# Patient Record
Sex: FEMALE | Race: WHITE | Employment: OTHER | URBAN - METROPOLITAN AREA
[De-identification: names, ages, dates, MRNs, and addresses within clinical notes are randomized per-mention and may not be internally consistent; named-entity substitution may affect disease eponyms.]

---

## 2018-04-16 ENCOUNTER — OFFICE VISIT (OUTPATIENT)
Dept: PHYSICAL MEDICINE AND REHAB | Age: 63
End: 2018-04-16
Payer: MEDICAID

## 2018-04-16 VITALS
TEMPERATURE: 97 F | WEIGHT: 152 LBS | HEIGHT: 61 IN | DIASTOLIC BLOOD PRESSURE: 76 MMHG | SYSTOLIC BLOOD PRESSURE: 140 MMHG | BODY MASS INDEX: 28.7 KG/M2 | HEART RATE: 61 BPM

## 2018-04-16 DIAGNOSIS — G89.29 BILATERAL CHRONIC KNEE PAIN: ICD-10-CM

## 2018-04-16 DIAGNOSIS — G89.29 CHRONIC BILATERAL LOW BACK PAIN WITH BILATERAL SCIATICA: Primary | ICD-10-CM

## 2018-04-16 DIAGNOSIS — M25.561 BILATERAL CHRONIC KNEE PAIN: ICD-10-CM

## 2018-04-16 DIAGNOSIS — M54.42 CHRONIC BILATERAL LOW BACK PAIN WITH BILATERAL SCIATICA: Primary | ICD-10-CM

## 2018-04-16 DIAGNOSIS — M17.0 PRIMARY OSTEOARTHRITIS OF BOTH KNEES: ICD-10-CM

## 2018-04-16 DIAGNOSIS — M54.41 CHRONIC BILATERAL LOW BACK PAIN WITH BILATERAL SCIATICA: Primary | ICD-10-CM

## 2018-04-16 DIAGNOSIS — M54.16 LUMBAR RADICULOPATHY: ICD-10-CM

## 2018-04-16 DIAGNOSIS — M25.562 BILATERAL CHRONIC KNEE PAIN: ICD-10-CM

## 2018-04-16 PROCEDURE — 2500000003 HC RX 250 WO HCPCS

## 2018-04-16 PROCEDURE — 3014F SCREEN MAMMO DOC REV: CPT | Performed by: PHYSICAL MEDICINE & REHABILITATION

## 2018-04-16 PROCEDURE — 3017F COLORECTAL CA SCREEN DOC REV: CPT | Performed by: PHYSICAL MEDICINE & REHABILITATION

## 2018-04-16 PROCEDURE — 20610 DRAIN/INJ JOINT/BURSA W/O US: CPT | Performed by: PHYSICAL MEDICINE & REHABILITATION

## 2018-04-16 PROCEDURE — 4004F PT TOBACCO SCREEN RCVD TLK: CPT | Performed by: PHYSICAL MEDICINE & REHABILITATION

## 2018-04-16 PROCEDURE — 6360000002 HC RX W HCPCS

## 2018-04-16 PROCEDURE — G8427 DOCREV CUR MEDS BY ELIG CLIN: HCPCS | Performed by: PHYSICAL MEDICINE & REHABILITATION

## 2018-04-16 PROCEDURE — G8419 CALC BMI OUT NRM PARAM NOF/U: HCPCS | Performed by: PHYSICAL MEDICINE & REHABILITATION

## 2018-04-16 PROCEDURE — 99214 OFFICE O/P EST MOD 30 MIN: CPT | Performed by: PHYSICAL MEDICINE & REHABILITATION

## 2018-04-16 PROCEDURE — 99213 OFFICE O/P EST LOW 20 MIN: CPT

## 2018-04-16 PROCEDURE — G8598 ASA/ANTIPLAT THER USED: HCPCS | Performed by: PHYSICAL MEDICINE & REHABILITATION

## 2018-04-16 RX ORDER — GABAPENTIN 300 MG/1
600 CAPSULE ORAL 3 TIMES DAILY
Qty: 180 CAPSULE | Refills: 2 | Status: SHIPPED | OUTPATIENT
Start: 2018-04-16 | End: 2018-08-04 | Stop reason: SDUPTHER

## 2018-04-16 RX ORDER — TRIAMCINOLONE ACETONIDE 40 MG/ML
80 INJECTION, SUSPENSION INTRA-ARTICULAR; INTRAMUSCULAR ONCE
Status: COMPLETED | OUTPATIENT
Start: 2018-04-16 | End: 2018-04-16

## 2018-04-16 RX ORDER — LIDOCAINE HYDROCHLORIDE 10 MG/ML
8 INJECTION, SOLUTION INFILTRATION; PERINEURAL ONCE
Status: COMPLETED | OUTPATIENT
Start: 2018-04-16 | End: 2018-04-16

## 2018-04-16 RX ADMIN — LIDOCAINE HYDROCHLORIDE 8 ML: 10 INJECTION, SOLUTION INFILTRATION; PERINEURAL at 09:38

## 2018-04-16 RX ADMIN — TRIAMCINOLONE ACETONIDE 80 MG: 40 INJECTION, SUSPENSION INTRA-ARTICULAR; INTRAMUSCULAR at 09:39

## 2018-07-25 ENCOUNTER — TELEPHONE (OUTPATIENT)
Dept: CARDIOLOGY CLINIC | Age: 63
End: 2018-07-25

## 2018-07-25 NOTE — TELEPHONE ENCOUNTER
Pt calls requesting an appt with Dr Jennifer Mcbride, last seen 01-07-16. Pt is c/o CP that radiates to back, SOB, weakness in legs and arms, worse when doing stairs. Pt was given first available, but was agreeable to going to ER if symptoms worsen. Please review.

## 2018-07-25 NOTE — TELEPHONE ENCOUNTER
Called and left message to advise patient she could be seen sooner with our nurse practitioner.   Advised her to call the office on her voice mail

## 2018-08-06 RX ORDER — GABAPENTIN 300 MG/1
CAPSULE ORAL
Qty: 180 CAPSULE | Refills: 2 | Status: SHIPPED | OUTPATIENT
Start: 2018-08-06 | End: 2018-11-04

## 2018-08-16 ENCOUNTER — TELEPHONE (OUTPATIENT)
Dept: ADMINISTRATIVE | Age: 63
End: 2018-08-16

## 2019-05-15 ENCOUNTER — OFFICE VISIT (OUTPATIENT)
Dept: CARDIOLOGY CLINIC | Age: 64
End: 2019-05-15
Payer: MEDICAID

## 2019-05-15 VITALS — BODY MASS INDEX: 28.72 KG/M2 | HEIGHT: 61 IN

## 2019-05-15 DIAGNOSIS — R07.9 CHEST PAIN, UNSPECIFIED TYPE: ICD-10-CM

## 2019-05-15 DIAGNOSIS — J41.0 SIMPLE CHRONIC BRONCHITIS (HCC): ICD-10-CM

## 2019-05-15 DIAGNOSIS — G89.29 CHRONIC PAIN OF BOTH KNEES: ICD-10-CM

## 2019-05-15 DIAGNOSIS — R06.02 SOBOE (SHORTNESS OF BREATH ON EXERTION): ICD-10-CM

## 2019-05-15 DIAGNOSIS — M25.561 CHRONIC PAIN OF BOTH KNEES: ICD-10-CM

## 2019-05-15 DIAGNOSIS — R00.1 SINUS BRADYCARDIA: ICD-10-CM

## 2019-05-15 DIAGNOSIS — M54.50 CHRONIC MIDLINE LOW BACK PAIN WITHOUT SCIATICA: ICD-10-CM

## 2019-05-15 DIAGNOSIS — Z98.890 STATUS POST ABLATION OF ATRIAL FLUTTER: ICD-10-CM

## 2019-05-15 DIAGNOSIS — Z72.0 TOBACCO USE: ICD-10-CM

## 2019-05-15 DIAGNOSIS — I25.10 CORONARY ARTERY DISEASE INVOLVING NATIVE CORONARY ARTERY OF NATIVE HEART WITHOUT ANGINA PECTORIS: Primary | ICD-10-CM

## 2019-05-15 DIAGNOSIS — M25.562 CHRONIC PAIN OF BOTH KNEES: ICD-10-CM

## 2019-05-15 DIAGNOSIS — R00.2 HEART PALPITATIONS: ICD-10-CM

## 2019-05-15 DIAGNOSIS — Z86.79 STATUS POST ABLATION OF ATRIAL FLUTTER: ICD-10-CM

## 2019-05-15 DIAGNOSIS — G89.29 CHRONIC MIDLINE LOW BACK PAIN WITHOUT SCIATICA: ICD-10-CM

## 2019-05-15 DIAGNOSIS — I10 ESSENTIAL HYPERTENSION: ICD-10-CM

## 2019-05-15 DIAGNOSIS — R01.1 HEART MURMUR: ICD-10-CM

## 2019-05-15 PROCEDURE — 3023F SPIROM DOC REV: CPT | Performed by: INTERNAL MEDICINE

## 2019-05-15 PROCEDURE — 99214 OFFICE O/P EST MOD 30 MIN: CPT | Performed by: INTERNAL MEDICINE

## 2019-05-15 PROCEDURE — G8926 SPIRO NO PERF OR DOC: HCPCS | Performed by: INTERNAL MEDICINE

## 2019-05-15 PROCEDURE — 93000 ELECTROCARDIOGRAM COMPLETE: CPT | Performed by: INTERNAL MEDICINE

## 2019-05-15 PROCEDURE — G8427 DOCREV CUR MEDS BY ELIG CLIN: HCPCS | Performed by: INTERNAL MEDICINE

## 2019-05-15 PROCEDURE — G8419 CALC BMI OUT NRM PARAM NOF/U: HCPCS | Performed by: INTERNAL MEDICINE

## 2019-05-15 PROCEDURE — 3017F COLORECTAL CA SCREEN DOC REV: CPT | Performed by: INTERNAL MEDICINE

## 2019-05-15 PROCEDURE — 4004F PT TOBACCO SCREEN RCVD TLK: CPT | Performed by: INTERNAL MEDICINE

## 2019-05-15 PROCEDURE — G8598 ASA/ANTIPLAT THER USED: HCPCS | Performed by: INTERNAL MEDICINE

## 2019-05-15 RX ORDER — OMEPRAZOLE 40 MG/1
40 CAPSULE, DELAYED RELEASE ORAL DAILY
COMMUNITY

## 2019-05-15 RX ORDER — METOPROLOL SUCCINATE 50 MG/1
50 TABLET, EXTENDED RELEASE ORAL DAILY
COMMUNITY
End: 2019-08-15 | Stop reason: DRUGHIGH

## 2019-05-15 RX ORDER — CALCIUM CARBONATE 500(1250)
500 TABLET ORAL DAILY
COMMUNITY

## 2019-05-15 NOTE — PROGRESS NOTES
OFFICE VISIT     PRIMARY CARE PHYSICIAN:      SOCORRO Boston CNP       ALLERGIES / SENSITIVITIES:        Allergies   Allergen Reactions    Antihistamines, Diphenhydramine-Type Other (See Comments)    Asa [Aspirin]      Can take enteric coated    Bee Venom     Codeine     Pcn [Penicillins]     Sulfa Antibiotics           REVIEWED MEDICATIONS:        Current Outpatient Medications:     calcium carbonate (OSCAL) 500 MG TABS tablet, Take 500 mg by mouth daily, Disp: , Rfl:     Umeclidinium Bromide (INCRUSE ELLIPTA) 62.5 MCG/INH AEPB, Inhale into the lungs, Disp: , Rfl:     metoprolol succinate (TOPROL XL) 50 MG extended release tablet, Take 50 mg by mouth daily, Disp: , Rfl:     LORATADINE PO, Take by mouth as needed , Disp: , Rfl:     omeprazole (PRILOSEC) 40 MG delayed release capsule, Take 40 mg by mouth daily, Disp: , Rfl:     Oxymetazoline HCl (NASAL SPRAY NA), by Nasal route as needed, Disp: , Rfl:     oxybutynin (DITROPAN) 5 MG tablet, Take 5 mg by mouth nightly , Disp: , Rfl:     traZODone (DESYREL) 50 MG tablet, Take 50 mg by mouth nightly , Disp: , Rfl:     Menthol, Topical Analgesic, 4 % GEL, Apply 1 Film topically 4 times daily as needed (pain), Disp: 473 mL, Rfl: 2    lidocaine (LIDODERM) 5 %, Place 1 patch onto the skin every 24 hours 12 hours on, 12 hours off., Disp: 30 patch, Rfl: 0    amLODIPine (NORVASC) 10 MG tablet, TAKE 1 TABLET BY MOUTH EVERY DAY, Disp: 90 tablet, Rfl: 3    busPIRone (BUSPAR) 5 MG tablet, Take 5 mg by mouth 3 times daily, Disp: , Rfl:     Ketoprofen 10 % CREA, by Does not apply route as needed, Disp: , Rfl:     lidocaine (XYLOCAINE) 5 % ointment, Apply topically as needed for Pain Apply topically as needed. , Disp: , Rfl:     acetaminophen (TYLENOL) 500 MG tablet, Take 1,000 mg by mouth every 8 hours, Disp: , Rfl:     nitroGLYCERIN (NITROLINGUAL) 0.4 MG/SPRAY spray, Place 1 spray under the tongue every 5 minutes as needed for Chest pain, Disp: , Rfl:   levothyroxine (SYNTHROID) 200 MCG tablet,  Take 200 mcg by mouth daily , Disp: , Rfl:     ezetimibe (ZETIA) 10 MG tablet, Take 10 mg by mouth daily, Disp: , Rfl:     enalapril (VASOTEC) 5 MG tablet, Take 1 tablet by mouth daily, Disp: 90 tablet, Rfl: 2    clopidogrel (PLAVIX) 75 MG tablet, Take 1 tablet by mouth daily, Disp: 90 tablet, Rfl: 2    aspirin 81 MG tablet, Take 81 mg by mouth daily. , Disp: , Rfl:     albuterol-ipratropium (COMBIVENT)  MCG/ACT inhaler, Inhale 2 puffs into the lungs 4 times daily. , Disp: , Rfl:     beclomethasone (QVAR) 40 MCG/ACT inhaler, Inhale 2 puffs into the lungs 2 times daily. , Disp: , Rfl:     albuterol (PROVENTIL) (2.5 MG/3ML) 0.083% nebulizer solution, Take 2.5 mg by nebulization every 6 hours as needed. , Disp: , Rfl:     EPINEPHrine (EPIPEN IJ), Inject  as directed as needed. , Disp: , Rfl:     Tetrahydrozoline HCl (EYE DROPS OP), Apply  to eye 2 times daily. , Disp: , Rfl:     raloxifene (EVISTA) 60 MG tablet, Take 60 mg by mouth daily. , Disp: , Rfl:     NITROGLYCERIN TRANSDERMAL TD, Place  onto the skin. 0.4 mg nightly, 0.2 mg in the morning, Disp: , Rfl:     Calcium Carbonate-Vitamin D (OYSTER CALCIUM + D PO), Take 500 mg by mouth 2 times daily. , Disp: , Rfl:     OXYGEN, Inhale 2 L into the lungs nightly., Disp: , Rfl:     gabapentin (NEURONTIN) 300 MG capsule, TAKE 2 CAPSULES BY MOUTH THREE TIMES DAILY, Disp: 180 capsule, Rfl: 2    SYMBICORT 80-4.5 MCG/ACT AERO, INL 2 PFS PO BID IN THE MORNING AND IN THE DEN, Disp: , Rfl: 5    topiramate (TOPAMAX) 25 MG tablet, Take 25 mg by mouth 2 times daily, Disp: , Rfl:     pravastatin (PRAVACHOL) 40 MG tablet, Take 40 mg by mouth daily. , Disp: , Rfl:     diazepam (VALIUM) 10 MG tablet, Take 10 mg by mouth nightly., Disp: , Rfl:     ranitidine (ZANTAC) 150 MG tablet, Take 150 mg by mouth 2 times daily. , Disp: , Rfl:       S: REASON FOR VISIT:       Chief Complaint   Patient presents with    Bradycardia Re-est w/ office last ov in 2015; c/o chest pressure and leg pain          History of Present Illness:       Office Visit for follow up of CAD, A Flutter,    Last seen 4 yrs ago   C/o CP, more at rest, mild, mid sternal, dull-ache, no radiation, no associated Sx, lasts few min,relieve on its own. C/o POMPA - No PND or orthopnea   C/o heart palpitiatons   Smokes 1 pack in 3 days   No hospitalizations or surgeries since last visit   Pablo any exertional chest pain   No palpitations, dizzy or syncope. Active at home   No orthopnea   Try to watch diet   Compliant with all medications       Past Medical History:   Diagnosis Date    ADHD (attention deficit hyperactivity disorder)     Allergic rhinitis     Anxiety     Asthma     Atrial fibrillation (HCC)     CAD (coronary artery disease)     Carpal tunnel syndrome     Chronic back pain     COPD (chronic obstructive pulmonary disease) ([address]     Depression     Diabetes mellitus (HCC)     borderline, diet controlled    Diverticul disease small and large intestine, no perforati or abscess     Emphysema of lung ([address]     Fibromyalgia     Gall stones 1976    Gall stones     GERD (gastroesophageal reflux disease)     Grave's disease     10 year history    Headache(784.0)     Hiatal hernia     ulceration    History of rheumatic fever     age 13, resulted in cardiac murmur     History of scarlet fever     age 13    Hyperlipidemia     Hypertension     Irritable bowel syndrome     Osteoarthritis     Restless legs syndrome     Sleep apnea     SVT     recurrent history     Tobacco abuse     Urinary incontinence             Past Surgical History:   Procedure Laterality Date    APPENDECTOMY      ATRIAL ABLATION SURGERY  1/12/00    EP study and ablation study in 1/00 with Dr. Conner Jauregui;  Ablation for atrial flutter    CHOLECYSTECTOMY      COLONOSCOPY      DIAGNOSTIC CARDIAC CATH LAB PROCEDURE  9/28/95    Normal LV and normal valves, 30% OMB of CX    carotid bruit. Abdomen:  Soft, Non tender, Bowel sounds normal, no pulsatile abdominal aorta, no palpable masses. Extremities:  No edema. Distal pulses palpable. No cyanosis, no clubbing. Skin:   Good turgor, warm and dry, no cyanosis. Musculoskeletal: No joint swelling or deformity. Neuro:   Cranial nerves grossly intact; No focal neurologic deficit. Psych:   Alert, good mood and effect. REVIEW OF DIAGNOSTIC TESTS:        Electrocardiogram: Reviewed          A/P:   ASSESSMENT / PLAN:    Valerie Munoz was seen today for bradycardia. Diagnoses and all orders for this visit:    Coronary artery disease involving native coronary artery of native heart without angina pectoris - cath 9/2003-Dr Gladys Rivas; D1 80%, D2 50%, Total OM with collaterals, RCA proc 40, Mid 45-50, distal 40% - On ASA, Pavix, BB, Statins  -     EKG 12 Lead  -     ECHO Complete 2D W Doppler W Color; Future  -     Stress test, lexiscan; Future    Chest pain, unspecified type  -     Stress test, lexiscan; Future    Sinus bradycardia - Asymtomatic    Hx if TIA     Heart palpitations - Avoid excessive caffine  -     Stress test, lexiscan; Future  -     Holter monitor 48 hour; Future    SOBOE (shortness of breath on exertion) - No acute CHF  -     ECHO Complete 2D W Doppler W Color; Future  -     Stress test, lexiscan; Future    Status post ablation of atrial flutter in 2000 by Dr Berhane Harvey  -     Holter monitor 48 hour; Future    Essential hypertension - Stable  -     Stress test, lexiscan; Future    Tobacco use - Counseled to quit smoking    Simple chronic bronchitis (HCC)    Chronic pain of both knees    Chronic midline low back pain without sciatica    Heart murmur  -     ECHO Complete 2D W Doppler W Color; Future     Preventive Cardiology: Low cholesterol diet, regular exercise as tolerate, and gradual weight loss discussed. Monitor BP and heart rates. All questions answered about cardiac diagnoses and cardiac medications.    Continue current medications. Compliance with medications and f/u with all physicians discussed. Risk factor modification based on risk profile discussed. Call if any exertional chest pain, short of breath, dizzy or palpitations   Follow up after tests or earlier if needed.          [facility]  [address], [place], [address] [facility]  [phone]

## 2019-06-25 ENCOUNTER — TELEPHONE (OUTPATIENT)
Dept: CARDIOLOGY | Age: 64
End: 2019-06-25

## 2019-06-26 ENCOUNTER — HOSPITAL ENCOUNTER (OUTPATIENT)
Dept: CARDIOLOGY | Age: 64
Discharge: HOME | End: 2019-06-26
Payer: MEDICAID

## 2019-06-26 VITALS
DIASTOLIC BLOOD PRESSURE: 78 MMHG | SYSTOLIC BLOOD PRESSURE: 120 MMHG | WEIGHT: 152 LBS | HEIGHT: 61 IN | HEART RATE: 67 BPM | BODY MASS INDEX: 28.7 KG/M2

## 2019-06-26 DIAGNOSIS — I25.10 CORONARY ARTERY DISEASE INVOLVING NATIVE CORONARY ARTERY OF NATIVE HEART WITHOUT ANGINA PECTORIS: ICD-10-CM

## 2019-06-26 DIAGNOSIS — R01.1 HEART MURMUR: ICD-10-CM

## 2019-06-26 DIAGNOSIS — R06.02 SOBOE (SHORTNESS OF BREATH ON EXERTION): ICD-10-CM

## 2019-06-26 DIAGNOSIS — I10 ESSENTIAL HYPERTENSION: ICD-10-CM

## 2019-06-26 DIAGNOSIS — R07.9 CHEST PAIN, UNSPECIFIED TYPE: ICD-10-CM

## 2019-06-26 DIAGNOSIS — R00.2 HEART PALPITATIONS: ICD-10-CM

## 2019-06-26 LAB
LV EF: 60 %
LVEF MODALITY: NORMAL

## 2019-06-26 PROCEDURE — 93306 TTE W/DOPPLER COMPLETE: CPT

## 2019-06-26 PROCEDURE — 93016 CV STRESS TEST SUPVJ ONLY: CPT | Performed by: INTERNAL MEDICINE

## 2019-06-26 PROCEDURE — A9502 TC99M TETROFOSMIN: HCPCS | Performed by: INTERNAL MEDICINE

## 2019-06-26 PROCEDURE — 93017 CV STRESS TEST TRACING ONLY: CPT

## 2019-06-26 PROCEDURE — 78452 HT MUSCLE IMAGE SPECT MULT: CPT | Performed by: INTERNAL MEDICINE

## 2019-06-26 PROCEDURE — 2580000003 HC RX 258: Performed by: INTERNAL MEDICINE

## 2019-06-26 PROCEDURE — 6360000002 HC RX W HCPCS: Performed by: INTERNAL MEDICINE

## 2019-06-26 PROCEDURE — 93018 CV STRESS TEST I&R ONLY: CPT | Performed by: INTERNAL MEDICINE

## 2019-06-26 PROCEDURE — 78452 HT MUSCLE IMAGE SPECT MULT: CPT

## 2019-06-26 PROCEDURE — 3430000000 HC RX DIAGNOSTIC RADIOPHARMACEUTICAL: Performed by: INTERNAL MEDICINE

## 2019-06-26 RX ORDER — SODIUM CHLORIDE 0.9 % (FLUSH) 0.9 %
10 SYRINGE (ML) INJECTION PRN
Status: DISCONTINUED | OUTPATIENT
Start: 2019-06-26 | End: 2019-06-27 | Stop reason: HOSPADM

## 2019-06-26 RX ADMIN — Medication 10 ML: at 09:48

## 2019-06-26 RX ADMIN — REGADENOSON 0.4 MG: 0.08 INJECTION, SOLUTION INTRAVENOUS at 09:48

## 2019-06-26 RX ADMIN — TETROFOSMIN 8.5 MILLICURIE: 0.23 INJECTION, POWDER, LYOPHILIZED, FOR SOLUTION INTRAVENOUS at 08:41

## 2019-06-26 RX ADMIN — Medication 10 ML: at 08:41

## 2019-06-26 RX ADMIN — Medication 10 ML: at 09:45

## 2019-06-26 RX ADMIN — TETROFOSMIN 29.5 MILLICURIE: 0.23 INJECTION, POWDER, LYOPHILIZED, FOR SOLUTION INTRAVENOUS at 09:48

## 2019-06-26 NOTE — PROCEDURES
patient motion. A severe defect was present in the basal inferior wall(s) that was  small size on the post regadenoson images         The resting images are show no change. Gated SPECT left ventricular ejection fraction was calculated to be 75%, with normal myocardial thickening and wall motion. Impression:    1. Electrocardiographically normal regadenoson infusion with a clinicallynonischemic response. 2. Myocardial perfusion imaging was normal with attenuation artifact. 3. Overall left ventricular systolic function was normal without regional wall motion abnormalities. 4. Low risk general pharmacologic stress test    Thank you for sending your patient to this [facility].      Electronically signed by Norm Guerrero MD on 6/26/2019 at 1:47 PM

## 2019-07-05 ENCOUNTER — NURSE ONLY (OUTPATIENT)
Dept: CARDIOLOGY CLINIC | Age: 64
End: 2019-07-05

## 2019-08-05 ENCOUNTER — TELEPHONE (OUTPATIENT)
Dept: CARDIOLOGY CLINIC | Age: 64
End: 2019-08-05

## 2019-08-05 DIAGNOSIS — Z98.890 STATUS POST ABLATION OF ATRIAL FLUTTER: ICD-10-CM

## 2019-08-05 DIAGNOSIS — R00.2 HEART PALPITATIONS: ICD-10-CM

## 2019-08-05 DIAGNOSIS — Z86.79 STATUS POST ABLATION OF ATRIAL FLUTTER: ICD-10-CM

## 2019-08-15 ENCOUNTER — OFFICE VISIT (OUTPATIENT)
Dept: CARDIOLOGY CLINIC | Age: 64
End: 2019-08-15
Payer: MEDICAID

## 2019-08-15 VITALS
OXYGEN SATURATION: 96 % | WEIGHT: 141 LBS | HEART RATE: 56 BPM | DIASTOLIC BLOOD PRESSURE: 64 MMHG | HEIGHT: 61 IN | BODY MASS INDEX: 26.62 KG/M2 | SYSTOLIC BLOOD PRESSURE: 104 MMHG

## 2019-08-15 DIAGNOSIS — R06.02 SOBOE (SHORTNESS OF BREATH ON EXERTION): ICD-10-CM

## 2019-08-15 DIAGNOSIS — I25.10 CORONARY ARTERY DISEASE INVOLVING NATIVE CORONARY ARTERY OF NATIVE HEART WITHOUT ANGINA PECTORIS: ICD-10-CM

## 2019-08-15 DIAGNOSIS — Z86.79 STATUS POST ABLATION OF ATRIAL FLUTTER: ICD-10-CM

## 2019-08-15 DIAGNOSIS — Z72.0 TOBACCO USE: ICD-10-CM

## 2019-08-15 DIAGNOSIS — R00.1 BRADYCARDIA: Primary | ICD-10-CM

## 2019-08-15 DIAGNOSIS — R00.1 SINUS BRADYCARDIA: ICD-10-CM

## 2019-08-15 DIAGNOSIS — M54.50 CHRONIC MIDLINE LOW BACK PAIN WITHOUT SCIATICA: ICD-10-CM

## 2019-08-15 DIAGNOSIS — Z98.890 STATUS POST ABLATION OF ATRIAL FLUTTER: ICD-10-CM

## 2019-08-15 DIAGNOSIS — J41.0 SIMPLE CHRONIC BRONCHITIS (HCC): ICD-10-CM

## 2019-08-15 DIAGNOSIS — I10 ESSENTIAL HYPERTENSION: ICD-10-CM

## 2019-08-15 DIAGNOSIS — G89.29 CHRONIC MIDLINE LOW BACK PAIN WITHOUT SCIATICA: ICD-10-CM

## 2019-08-15 PROCEDURE — 3017F COLORECTAL CA SCREEN DOC REV: CPT | Performed by: INTERNAL MEDICINE

## 2019-08-15 PROCEDURE — 3023F SPIROM DOC REV: CPT | Performed by: INTERNAL MEDICINE

## 2019-08-15 PROCEDURE — G8598 ASA/ANTIPLAT THER USED: HCPCS | Performed by: INTERNAL MEDICINE

## 2019-08-15 PROCEDURE — 93000 ELECTROCARDIOGRAM COMPLETE: CPT | Performed by: INTERNAL MEDICINE

## 2019-08-15 PROCEDURE — 99213 OFFICE O/P EST LOW 20 MIN: CPT | Performed by: INTERNAL MEDICINE

## 2019-08-15 PROCEDURE — 4004F PT TOBACCO SCREEN RCVD TLK: CPT | Performed by: INTERNAL MEDICINE

## 2019-08-15 PROCEDURE — G8926 SPIRO NO PERF OR DOC: HCPCS | Performed by: INTERNAL MEDICINE

## 2019-08-15 PROCEDURE — G8427 DOCREV CUR MEDS BY ELIG CLIN: HCPCS | Performed by: INTERNAL MEDICINE

## 2019-08-15 PROCEDURE — G8419 CALC BMI OUT NRM PARAM NOF/U: HCPCS | Performed by: INTERNAL MEDICINE

## 2019-08-15 RX ORDER — METOPROLOL SUCCINATE 25 MG/1
25 TABLET, EXTENDED RELEASE ORAL DAILY
Qty: 90 TABLET | Refills: 3 | Status: SHIPPED | COMMUNITY
Start: 2019-08-15

## 2019-08-15 NOTE — PROGRESS NOTES
TAKE 2 CAPSULES BY MOUTH THREE TIMES DAILY, Disp: 180 capsule, Rfl: 2      S: REASON FOR VISIT:       Chief Complaint   Patient presents with    Follow-up     discuss stress echo and holter      Shortness of Breath    Chest Pain    Dizziness    Medication Problem     feels sluggish after taking b/p rx          History of Present Illness:       Office Visit for follow up of test results-Stress, Echo, Holter   She fell last week and hit left chest, having pain left posterior chest wall - she did not passed out   No hospitalizations or surgeries since last visit   C/o occ dizzyy   Pablo any exertional chest pain or short of breath   No palpitations or syncope.    Active at home   No orthopnea   Try to watch diet   Compliant with all medications       Past Medical History:   Diagnosis Date    ADHD (attention deficit hyperactivity disorder)     Allergic rhinitis     Anxiety     Asthma     Atrial fibrillation (HCC)     CAD (coronary artery disease)     Carpal tunnel syndrome     Chronic back pain     COPD (chronic obstructive pulmonary disease) ([address]     Depression     Diabetes mellitus (HCC)     borderline, diet controlled    Diverticul disease small and large intestine, no perforati or abscess     Emphysema of lung ([address]     Fibromyalgia     Gall stones 1976    Gall stones     GERD (gastroesophageal reflux disease)     Grave's disease     10 year history    Headache(784.0)     Hiatal hernia     ulceration    History of rheumatic fever     age 13, resulted in cardiac murmur     History of scarlet fever     age 13    Hyperlipidemia     Hypertension     Irritable bowel syndrome     Osteoarthritis     Restless legs syndrome     Sleep apnea     SVT     recurrent history     Tobacco abuse     Urinary incontinence             Past Surgical History:   Procedure Laterality Date    APPENDECTOMY      ATRIAL ABLATION SURGERY  1/12/00    EP study and ablation study in 1/00 with Dr. Hilary Mares;

## 2019-09-23 ENCOUNTER — APPOINTMENT (OUTPATIENT)
Dept: GENERAL RADIOLOGY | Age: 64
End: 2019-09-23
Payer: MEDICAID

## 2019-09-23 ENCOUNTER — HOSPITAL ENCOUNTER (EMERGENCY)
Age: 64
Discharge: HOME | End: 2019-09-23
Attending: EMERGENCY MEDICINE
Payer: MEDICAID

## 2019-09-23 ENCOUNTER — APPOINTMENT (OUTPATIENT)
Dept: CT IMAGING | Age: 64
End: 2019-09-23
Payer: MEDICAID

## 2019-09-23 VITALS
SYSTOLIC BLOOD PRESSURE: 134 MMHG | WEIGHT: 141 LBS | OXYGEN SATURATION: 98 % | HEIGHT: 61 IN | TEMPERATURE: 97.9 F | HEART RATE: 68 BPM | DIASTOLIC BLOOD PRESSURE: 83 MMHG | RESPIRATION RATE: 18 BRPM | BODY MASS INDEX: 26.62 KG/M2

## 2019-09-23 DIAGNOSIS — M25.552 LEFT HIP PAIN: Primary | ICD-10-CM

## 2019-09-23 PROCEDURE — 73552 X-RAY EXAM OF FEMUR 2/>: CPT

## 2019-09-23 PROCEDURE — 6360000002 HC RX W HCPCS: Performed by: EMERGENCY MEDICINE

## 2019-09-23 PROCEDURE — 96372 THER/PROPH/DIAG INJ SC/IM: CPT

## 2019-09-23 PROCEDURE — 6370000000 HC RX 637 (ALT 250 FOR IP): Performed by: EMERGENCY MEDICINE

## 2019-09-23 PROCEDURE — 99284 EMERGENCY DEPT VISIT MOD MDM: CPT

## 2019-09-23 PROCEDURE — 73502 X-RAY EXAM HIP UNI 2-3 VIEWS: CPT

## 2019-09-23 PROCEDURE — 72131 CT LUMBAR SPINE W/O DYE: CPT

## 2019-09-23 RX ORDER — ORPHENADRINE CITRATE 30 MG/ML
60 INJECTION INTRAMUSCULAR; INTRAVENOUS ONCE
Status: COMPLETED | OUTPATIENT
Start: 2019-09-23 | End: 2019-09-23

## 2019-09-23 RX ORDER — HYDROCODONE BITARTRATE AND ACETAMINOPHEN 5; 325 MG/1; MG/1
1 TABLET ORAL ONCE
Status: COMPLETED | OUTPATIENT
Start: 2019-09-23 | End: 2019-09-23

## 2019-09-23 RX ORDER — ORPHENADRINE CITRATE 100 MG/1
100 TABLET, EXTENDED RELEASE ORAL 2 TIMES DAILY
Qty: 10 TABLET | Refills: 0 | Status: SHIPPED | OUTPATIENT
Start: 2019-09-23 | End: 2019-09-28

## 2019-09-23 RX ADMIN — ORPHENADRINE CITRATE 60 MG: 30 INJECTION INTRAMUSCULAR; INTRAVENOUS at 20:09

## 2019-09-23 RX ADMIN — HYDROCODONE BITARTRATE AND ACETAMINOPHEN 1 TABLET: 5; 325 TABLET ORAL at 20:09

## 2019-09-23 ASSESSMENT — PAIN DESCRIPTION - PAIN TYPE: TYPE: ACUTE PAIN

## 2019-09-23 ASSESSMENT — PAIN SCALES - GENERAL
PAINLEVEL_OUTOF10: 10
PAINLEVEL_OUTOF10: 10

## 2019-09-23 ASSESSMENT — PAIN DESCRIPTION - ORIENTATION: ORIENTATION: LEFT

## 2019-09-23 ASSESSMENT — PAIN DESCRIPTION - LOCATION: LOCATION: HIP

## 2019-09-23 NOTE — ED PROVIDER NOTES
HPI:  9/23/19, Time: 6:35 PM         Regulo García is a 60 y.o. female presenting to the ED for a fall and left hip and leg pain, beginning 2 days ago. The complaint has been persistent, severe in severity, and worsened by standing, walking. Patient presents by EMS after a fall that occurred 3 days ago. She states that she tried getting up from bed, lost her balance, and fell onto her left side onto a pile of blankets. She denies hitting her head or losing consciousness. She states that she did not immediately have pain at all, but the following morning she awoke and had significant pain in her left hip and into her left thigh. She states that the pain is worse when she stands or walks. She denies any numbness or weakness in her legs and denies loss of bowel or bladder or perineal numbness or tingling. She denies any previous back surgeries. She denies any other issues or complaints other than the pain in her hip and leg.     Review of Systems:   Pertinent positives and negatives are stated within HPI, all other systems reviewed and are negative.          --------------------------------------------- PAST HISTORY ---------------------------------------------  Past Medical History:  has a past medical history of ADHD (attention deficit hyperactivity disorder), Allergic rhinitis, Anxiety, Asthma, Atrial fibrillation ([address] CAD (coronary artery disease), Carpal tunnel syndrome, Chronic back pain, COPD (chronic obstructive pulmonary disease) ([address] Depression, Diabetes mellitus ([address] Diverticul disease small and large intestine, no perforati or abscess, Emphysema of lung ([address] Fibromyalgia, Gall stones, Gall stones, GERD (gastroesophageal reflux disease), Grave's disease, Headache(784.0), Hiatal hernia, History of rheumatic fever, History of scarlet fever, Hyperlipidemia, Hypertension, Irritable bowel syndrome, Osteoarthritis, Restless legs syndrome, Sleep apnea, SVT, Tobacco abuse, and Urinary atraumatic, no raccoon eyes or randle sign  Eyes: PERRL, EOMI  Mouth: Oropharynx clear, handling secretions, no trismus  Neck: Supple, full ROM,   Pulmonary: Lungs clear to auscultation bilaterally, no wheezes, rales, or rhonchi. Not in respiratory distress  Cardiovascular:  Regular rate and rhythm, no murmurs, gallops, or rubs. 2+ distal pulses including strong pulses in both feet  Abdomen: Soft, non tender, non distended,   Extremities: Moves all extremities x 4. Warm and well perfused. Diffuse tenderness to palpation of the left lateral hip, anterior and lateral thigh, and left gluteal area. No obvious deformity. No bony tenderness. Mild lumbar left lateral muscle tenderness to palpation. No midline tenderness or step off. Skin: warm and dry without rash  Neurologic: GCS 15, muscle strength and sensation intact in bilateral upper and lower extremities. Psych: Normal Affect      ------------------------------ ED COURSE/MEDICAL DECISION MAKING----------------------  Medications   HYDROcodone-acetaminophen (NORCO) 5-325 MG per tablet 1 tablet (1 tablet Oral Given 9/23/19 2009)   orphenadrine (NORFLEX) injection 60 mg (60 mg Intramuscular Given 9/23/19 2009)         ED COURSE:  ED Course as of Sep 23 2117   Mon Sep 23, 2019   2014 Patient's x-rays and CT lumbar spine are negative for acute traumatic injury. Patient is still in pain and will be given Norflex and she will try to ambulate.    [BM]   2113 Patient reassessed. She states that she feels much better after the Norflex. She was able to ambulate to the bathroom without issue. Patient still has no cauda equina symptoms. Patient will be discharged with Norflex and is instructed on the safety of this medication. Patient voices understanding and is agreeable with discharge home.    [BM]      ED Course User Index  [BM] Jared Mann,        Medical Decision Making:    Patient presents after a fall a few days ago.  She did not hit her head or lose consciousness. She did not initially have pain but starting having pain in her left hip and thigh the next morning. She has no focal neurologic symptoms in the legs and has no signs or symptoms of cauda equina syndrome. She is tender in the left lumbar paraspinal muscles and the left hip and thigh but is neurovascularly intact in the leg. Patient does have a history of osteoporosis and there is concern for fracture. Patient will have x-rays of the left hip and thigh and a CT of the lumbar spine. She will also be given analgesia. Counseling: The emergency provider has spoken with the patient and discussed todays results, in addition to providing specific details for the plan of care and counseling regarding the diagnosis and prognosis. Questions are answered at this time and they are agreeable with the plan.      --------------------------------- IMPRESSION AND DISPOSITION ---------------------------------    IMPRESSION  1.  Left hip pain        DISPOSITION  Disposition: Discharge to home  Patient condition is stable         Elisabeth Reinoso DO  09/23/19 0478

## 2019-09-24 NOTE — ED NOTES
Patient ambulated to restroom independently. Complaining of minimal pain.      Segundo Mosley RN  09/23/19 [id number]

## 2019-11-11 ENCOUNTER — OFFICE VISIT (OUTPATIENT)
Dept: VASCULAR SURGERY | Age: 64
End: 2019-11-11
Payer: MEDICAID

## 2019-11-11 VITALS
SYSTOLIC BLOOD PRESSURE: 143 MMHG | HEART RATE: 56 BPM | HEIGHT: 61 IN | BODY MASS INDEX: 26.43 KG/M2 | WEIGHT: 140 LBS | DIASTOLIC BLOOD PRESSURE: 85 MMHG

## 2019-11-11 DIAGNOSIS — Z72.0 TOBACCO ABUSE: ICD-10-CM

## 2019-11-11 DIAGNOSIS — I73.9 PVD (PERIPHERAL VASCULAR DISEASE) (HCC): Primary | ICD-10-CM

## 2019-11-11 PROCEDURE — 4004F PT TOBACCO SCREEN RCVD TLK: CPT | Performed by: SURGERY

## 2019-11-11 PROCEDURE — G8419 CALC BMI OUT NRM PARAM NOF/U: HCPCS | Performed by: SURGERY

## 2019-11-11 PROCEDURE — 99204 OFFICE O/P NEW MOD 45 MIN: CPT | Performed by: SURGERY

## 2019-11-11 PROCEDURE — G8427 DOCREV CUR MEDS BY ELIG CLIN: HCPCS | Performed by: SURGERY

## 2019-11-11 PROCEDURE — 3017F COLORECTAL CA SCREEN DOC REV: CPT | Performed by: SURGERY

## 2019-11-11 PROCEDURE — G8484 FLU IMMUNIZE NO ADMIN: HCPCS | Performed by: SURGERY

## 2019-11-11 PROCEDURE — G8598 ASA/ANTIPLAT THER USED: HCPCS | Performed by: SURGERY

## 2020-05-19 ENCOUNTER — TELEPHONE (OUTPATIENT)
Dept: VASCULAR SURGERY | Age: 65
End: 2020-05-19

## 2020-06-10 ENCOUNTER — TELEPHONE (OUTPATIENT)
Dept: CARDIOLOGY CLINIC | Age: 65
End: 2020-06-10

## 2020-07-14 ENCOUNTER — TELEPHONE (OUTPATIENT)
Dept: VASCULAR SURGERY | Age: 65
End: 2020-07-14

## 2020-07-14 NOTE — TELEPHONE ENCOUNTER
Received referral from [address] [place] [address]. Patient saw Dr. Mu Valadez in November and was recommended to have lower extremity arterial doppler study, which was cancelled by patient. Attempted to call patient to set up doppler study and follow up with Dr. Mu Valadez but voicemail has not been set up.

## 2020-08-28 ENCOUNTER — TELEPHONE (OUTPATIENT)
Dept: VASCULAR SURGERY | Age: 65
End: 2020-08-28

## 2020-10-21 ENCOUNTER — HOSPITAL ENCOUNTER (OUTPATIENT)
Dept: INTERVENTIONAL RADIOLOGY/VASCULAR | Age: 65
Discharge: HOME | End: 2020-10-23
Payer: MEDICAID

## 2020-10-21 PROCEDURE — 93923 UPR/LXTR ART STDY 3+ LVLS: CPT

## 2020-10-23 ENCOUNTER — TELEPHONE (OUTPATIENT)
Dept: VASCULAR SURGERY | Age: 65
End: 2020-10-23

## 2020-10-26 ENCOUNTER — OFFICE VISIT (OUTPATIENT)
Dept: VASCULAR SURGERY | Age: 65
End: 2020-10-26
Payer: MEDICAID

## 2020-10-26 VITALS
HEIGHT: 61 IN | BODY MASS INDEX: 27.75 KG/M2 | HEART RATE: 50 BPM | DIASTOLIC BLOOD PRESSURE: 78 MMHG | WEIGHT: 147 LBS | SYSTOLIC BLOOD PRESSURE: 128 MMHG

## 2020-10-26 PROCEDURE — G8484 FLU IMMUNIZE NO ADMIN: HCPCS | Performed by: NURSE PRACTITIONER

## 2020-10-26 PROCEDURE — G8427 DOCREV CUR MEDS BY ELIG CLIN: HCPCS | Performed by: NURSE PRACTITIONER

## 2020-10-26 PROCEDURE — G8419 CALC BMI OUT NRM PARAM NOF/U: HCPCS | Performed by: NURSE PRACTITIONER

## 2020-10-26 PROCEDURE — 99213 OFFICE O/P EST LOW 20 MIN: CPT | Performed by: NURSE PRACTITIONER

## 2020-10-26 PROCEDURE — 4004F PT TOBACCO SCREEN RCVD TLK: CPT | Performed by: NURSE PRACTITIONER

## 2020-10-26 PROCEDURE — 3017F COLORECTAL CA SCREEN DOC REV: CPT | Performed by: NURSE PRACTITIONER

## 2020-10-26 NOTE — PROGRESS NOTES
Vascular Surgery Outpatient Followup    PCP : Cherylene Gault, DO    HISTORY OF PRESENT ILLNESS:    The patient is a 63 y.o. female who is here in regards to follow up of their PVD. The patient was last seen in 11/2019. At that time she was complaining of numbness, tingling, and pain in her bilateral lower extremities. She had difficulty walking because of this. Her symptoms started in her lower back and radiated into her bilateral groins and all the way down to her feet. These symptoms started in 11/2018. She denies any palliating measures. Since she was last seen, her symptoms remain the same but have worsened. She tells me that she is going to have spinal studies done in the near future. She has never had back surgery. She has chronically taken gabapentin since 2017 per her report.       She denies lifestyle limiting claudication, rest pain, or tissue loss. She is a current smoker with no interest in quitting.      ROS : All others Negative if blank [], Positive if [x]  General Urinary   [] Fevers [] Hematuria   [] Chills [] Dysuria   [] Weight Loss Vascular   Skin [] Claudication   [] Tissue Loss [] Rest Pain   Eyes Neurologic   [x] Wears Glasses/Contacts [x] Stroke/TIA- in the past, denies residual sxs   [] Vision Changes [] Focal weakness   Respiratory [] Slurred Speech    [] Shortness of breath    Cardiovascular    [] Chest Pain    [x] Shortness of breath with exertion    Gastrointestinal    [] Abdominal Pain    [] Melena   [] Hematochezia         Past Medical History:        Diagnosis Date    ADHD (attention deficit hyperactivity disorder)     Allergic rhinitis     Anxiety     Asthma     Atrial fibrillation (HCC)     CAD (coronary artery disease)     Carpal tunnel syndrome     Chronic back pain     COPD (chronic obstructive pulmonary disease) (HCC)     Depression     Diabetes mellitus (HCC)     borderline, diet controlled    Diverticul disease small and large intestine, no perforati or abscess     Emphysema of lung ([facility] [address]     Fibromyalgia     Gall stones 1976    Gall stones     GERD (gastroesophageal reflux disease)     Grave's disease     10 year history    Headache(784.0)     Hiatal hernia     ulceration    History of rheumatic fever     age 13, resulted in cardiac murmur     History of scarlet fever     age 13    Hyperlipidemia     Hypertension     Irritable bowel syndrome     Osteoarthritis     Restless legs syndrome     Sleep apnea     SVT     recurrent history     Tobacco abuse     Urinary incontinence      Past Surgical History:        Procedure Laterality Date    APPENDECTOMY      ATRIAL ABLATION SURGERY  1/12/00    EP study and ablation study in 1/00 with Dr. Zoey Beth;  Ablation for atrial flutter    CHOLECYSTECTOMY      COLONOSCOPY      DIAGNOSTIC CARDIAC CATH LAB PROCEDURE  9/28/95    Normal LV and normal valves, 30% OMB of CX    DIAGNOSTIC CARDIAC CATH LAB PROCEDURE  9/16/99    Normal LV and normal valves, 45% OMB of CX, 20 of dominant RCA     DIAGNOSTIC CARDIAC CATH LAB PROCEDURE  9/23/03    Treated in medical fashion    HYSTERECTOMY      WRIST SURGERY  6/95     Current Medications:   Current Outpatient Medications   Medication Sig Dispense Refill    metoprolol succinate (TOPROL XL) 25 MG extended release tablet Take 1 tablet by mouth daily 90 tablet 3    calcium carbonate (OSCAL) 500 MG TABS tablet Take 500 mg by mouth daily      Umeclidinium Bromide (INCRUSE ELLIPTA) 62.5 MCG/INH AEPB Inhale into the lungs      LORATADINE PO Take by mouth as needed       omeprazole (PRILOSEC) 40 MG delayed release capsule Take 40 mg by mouth daily      Oxymetazoline HCl (NASAL SPRAY NA) by Nasal route as needed      SYMBICORT 80-4.5 MCG/ACT AERO INL 2 PFS PO BID IN THE MORNING AND IN THE DEN  5    oxybutynin (DITROPAN) 5 MG tablet Take 5 mg by mouth nightly       traZODone (DESYREL) 50 MG tablet Take 50 mg by mouth nightly       Menthol, Topical MG tablet Take 150 mg by mouth 2 times daily.  NITROGLYCERIN TRANSDERMAL TD Place  onto the skin. 0.4 mg nightly, 0.2 mg in the morning      Calcium Carbonate-Vitamin D (OYSTER CALCIUM + D PO) Take 500 mg by mouth 2 times daily.  OXYGEN Inhale 2 L into the lungs nightly.  gabapentin (NEURONTIN) 300 MG capsule TAKE 2 CAPSULES BY MOUTH THREE TIMES DAILY 180 capsule 2     No current facility-administered medications for this visit. Allergies:  Antihistamines, diphenhydramine-type; Asa [aspirin]; Bee venom; Codeine; Pcn [penicillins]; and Sulfa antibiotics  Social History     Socioeconomic History    Marital status:      Spouse name: Not on file    Number of children: Not on file    Years of education: Not on file    Highest education level: Not on file   Occupational History    Not on file   Social Needs    Financial resource strain: Not on file    Food insecurity     Worry: Not on file     Inability: Not on file    Transportation needs     Medical: Not on file     Non-medical: Not on file   Tobacco Use    Smoking status: Current Every Day Smoker     Packs/day: 0.25     Types: Cigarettes    Smokeless tobacco: Never Used    Tobacco comment: patient smokes 3-5 cigarettes per day   Substance and Sexual Activity    Alcohol use:  Yes     Alcohol/week: 0.0 standard drinks     Comment: rare beer;  drinks 8 cups of coffee daily    Drug use: No    Sexual activity: Not on file   Lifestyle    Physical activity     Days per week: Not on file     Minutes per session: Not on file    Stress: Not on file   Relationships    Social connections     Talks on phone: Not on file     Gets together: Not on file     Attends Orthodoxy service: Not on file     Active member of club or organization: Not on file     Attends meetings of clubs or organizations: Not on file     Relationship status: Not on file    Intimate partner violence     Fear of current or ex partner: Not on file     Emotionally abused: Not on file     Physically abused: Not on file     Forced sexual activity: Not on file   Other Topics Concern    Not on file   Social History Narrative    Not on file     Family History   Problem Relation Age of Onset    Diabetes Mother      Labs  Lab Results   Component Value Date    WBC 7.0 05/10/2014    HGB 13.2 05/10/2014    HCT 40.2 05/10/2014     05/10/2014    K 3.7 05/10/2014    BUN 15 05/10/2014    CREATININE 0.9 05/10/2014     PHYSICAL EXAM:    /78 (Site: Right Upper Arm, Position: Sitting, Cuff Size: Medium Adult)   Pulse 50   Ht 5' 1\" (1.549 m)   Wt 147 lb (66.7 kg)   BMI 27.78 kg/m²   CONSTITUTIONAL:   Awake, alert, cooperative  PSYCHIATRIC :  Oriented to time, place and person     Appropriate insight to disease process  EYES: Lids and lashes normal  ENT:  External ears and nose without lesions   Hearing deficits absent  NECK: Supple, symmetrical, trachea midline   Carotid bruit absent  LUNGS:  No increased work of breathing  CARDIOVASCULAR:  regular rate and rhythm   ABDOMEN:  soft, non-distended, non-tender  SKIN:   Normal skin color   Texture and turgor normal, no induration  EXTREMITIES:   R LE Edema absent  L LE Edema absent  R posterior tibial Weakly biphasic L posterior tibial monophasic   R dorsalis pedis monophasic L dorsalis pedis Weakly biphasic     RADIOLOGY:   VIVIANA R 0.89  L 1.08  TBI R  0.77 L 0.85    A/P PVD  · Pt has evidence of pvd on exam  · Arterial studies reviewed, mild disease- better than expected based on exam  · Her symptoms are not consistent with arterial occlusive disease- they are likely related to issues with her back  · Continue medical management with ASA and statin  · Emphasized importance of Tobacco cessation- she is not interested in quitting at that time  · they understood that with tobacco use they are at increased risk of worsening of their pvd and increased risk of limb loss  · No indication for surgical intervention at this time  · Discussed with patient pathophysiology of pvd, claudication, tissues loss, rest pain, and potential for limb loss  · Discussed with patient symptoms of new onset claudication, tissue loss, rest pain, changes in lower extremity coolness, pain and they understood to go to ER immediately if any symptoms developed  · F/U as outpatient in 12 Months with repeat vascular studies      Chronic back pain  Chronic hip pain  Bilateral lower extremity numbness and tingling  · Primary etiology of her symptoms seems to be related to this and not her arterial disease  · Work-up per her primary care doctor  · She is chronically on gabapentin    Pt seen and plan reviewed with Dr. Leblanc Head.      Juan Cobb, CNP

## 2020-11-06 ENCOUNTER — HOSPITAL ENCOUNTER (EMERGENCY)
Age: 65
Discharge: HOME | End: 2020-11-06
Attending: EMERGENCY MEDICINE
Payer: MEDICAID

## 2020-11-06 ENCOUNTER — APPOINTMENT (OUTPATIENT)
Dept: GENERAL RADIOLOGY | Age: 65
End: 2020-11-06
Payer: MEDICAID

## 2020-11-06 VITALS
TEMPERATURE: 97.7 F | OXYGEN SATURATION: 100 % | HEART RATE: 69 BPM | DIASTOLIC BLOOD PRESSURE: 77 MMHG | SYSTOLIC BLOOD PRESSURE: 127 MMHG | RESPIRATION RATE: 18 BRPM

## 2020-11-06 LAB
ALBUMIN SERPL-MCNC: 4 G/DL (ref 3.5–5.2)
ALP BLD-CCNC: 90 U/L (ref 35–104)
ALT SERPL-CCNC: 6 U/L (ref 0–32)
ANION GAP SERPL CALCULATED.3IONS-SCNC: 9 MMOL/L (ref 7–16)
AST SERPL-CCNC: 13 U/L (ref 0–31)
BASOPHILS ABSOLUTE: 0.06 E9/L (ref 0–0.2)
BASOPHILS RELATIVE PERCENT: 0.7 % (ref 0–2)
BILIRUB SERPL-MCNC: 0.9 MG/DL (ref 0–1.2)
BUN BLDV-MCNC: 15 MG/DL (ref 8–23)
CALCIUM SERPL-MCNC: 9.3 MG/DL (ref 8.6–10.2)
CHLORIDE BLD-SCNC: 102 MMOL/L (ref 98–107)
CO2: 26 MMOL/L (ref 22–29)
CREAT SERPL-MCNC: 1 MG/DL (ref 0.5–1)
EKG ATRIAL RATE: 56 BPM
EKG P AXIS: 62 DEGREES
EKG P-R INTERVAL: 172 MS
EKG Q-T INTERVAL: 452 MS
EKG QRS DURATION: 100 MS
EKG QTC CALCULATION (BAZETT): 436 MS
EKG R AXIS: 34 DEGREES
EKG T AXIS: 7 DEGREES
EKG VENTRICULAR RATE: 56 BPM
EOSINOPHILS ABSOLUTE: 0.13 E9/L (ref 0.05–0.5)
EOSINOPHILS RELATIVE PERCENT: 1.5 % (ref 0–6)
GFR AFRICAN AMERICAN: >60
GFR NON-AFRICAN AMERICAN: 56 ML/MIN/1.73
GLUCOSE BLD-MCNC: 100 MG/DL (ref 74–99)
HCT VFR BLD CALC: 43.7 % (ref 34–48)
HEMOGLOBIN: 13.9 G/DL (ref 11.5–15.5)
IMMATURE GRANULOCYTES #: 0.04 E9/L
IMMATURE GRANULOCYTES %: 0.5 % (ref 0–5)
LYMPHOCYTES ABSOLUTE: 2.08 E9/L (ref 1.5–4)
LYMPHOCYTES RELATIVE PERCENT: 23.9 % (ref 20–42)
MAGNESIUM: 2.3 MG/DL (ref 1.6–2.6)
MCH RBC QN AUTO: 30.9 PG (ref 26–35)
MCHC RBC AUTO-ENTMCNC: 31.8 % (ref 32–34.5)
MCV RBC AUTO: 97.1 FL (ref 80–99.9)
MONOCYTES ABSOLUTE: 0.59 E9/L (ref 0.1–0.95)
MONOCYTES RELATIVE PERCENT: 6.8 % (ref 2–12)
NEUTROPHILS ABSOLUTE: 5.82 E9/L (ref 1.8–7.3)
NEUTROPHILS RELATIVE PERCENT: 66.6 % (ref 43–80)
PDW BLD-RTO: 13.3 FL (ref 11.5–15)
PLATELET # BLD: 357 E9/L (ref 130–450)
PMV BLD AUTO: 9.6 FL (ref 7–12)
POTASSIUM SERPL-SCNC: 4.1 MMOL/L (ref 3.5–5)
PRO-BNP: 91 PG/ML (ref 0–125)
RBC # BLD: 4.5 E12/L (ref 3.5–5.5)
SODIUM BLD-SCNC: 137 MMOL/L (ref 132–146)
TOTAL PROTEIN: 7.6 G/DL (ref 6.4–8.3)
TROPONIN: <0.01 NG/ML (ref 0–0.03)
WBC # BLD: 8.7 E9/L (ref 4.5–11.5)

## 2020-11-06 PROCEDURE — 93010 ELECTROCARDIOGRAM REPORT: CPT | Performed by: INTERNAL MEDICINE

## 2020-11-06 PROCEDURE — 99283 EMERGENCY DEPT VISIT LOW MDM: CPT

## 2020-11-06 PROCEDURE — 94664 DEMO&/EVAL PT USE INHALER: CPT

## 2020-11-06 PROCEDURE — 84484 ASSAY OF TROPONIN QUANT: CPT

## 2020-11-06 PROCEDURE — 6370000000 HC RX 637 (ALT 250 FOR IP): Performed by: NURSE PRACTITIONER

## 2020-11-06 PROCEDURE — 83735 ASSAY OF MAGNESIUM: CPT

## 2020-11-06 PROCEDURE — 94640 AIRWAY INHALATION TREATMENT: CPT

## 2020-11-06 PROCEDURE — 71046 X-RAY EXAM CHEST 2 VIEWS: CPT

## 2020-11-06 PROCEDURE — 83880 ASSAY OF NATRIURETIC PEPTIDE: CPT

## 2020-11-06 PROCEDURE — 93005 ELECTROCARDIOGRAM TRACING: CPT | Performed by: NURSE PRACTITIONER

## 2020-11-06 PROCEDURE — 85025 COMPLETE CBC W/AUTO DIFF WBC: CPT

## 2020-11-06 PROCEDURE — 80053 COMPREHEN METABOLIC PANEL: CPT

## 2020-11-06 RX ORDER — IPRATROPIUM BROMIDE AND ALBUTEROL SULFATE 2.5; .5 MG/3ML; MG/3ML
1 SOLUTION RESPIRATORY (INHALATION) ONCE
Status: COMPLETED | OUTPATIENT
Start: 2020-11-06 | End: 2020-11-06

## 2020-11-06 RX ORDER — PREDNISONE 10 MG/1
TABLET ORAL
Qty: 20 TABLET | Refills: 0 | Status: SHIPPED | OUTPATIENT
Start: 2020-11-06 | End: 2020-11-16

## 2020-11-06 RX ADMIN — IPRATROPIUM BROMIDE AND ALBUTEROL SULFATE 1 AMPULE: .5; 3 SOLUTION RESPIRATORY (INHALATION) at 12:31

## 2020-11-06 NOTE — ED PROVIDER NOTES
ED Attending  CC: Angelica                  Department of Emergency Medicine   ED  Provider Note  Admit Date/RoomTime: 11/6/2020 11:53 AM  ED Room: 33/33  MRN: [id number]  Chief Complaint: Cough (pt states that she went pmd for cough and runny nose, states that he sent her here to r/o pneumonia. )       History of Present Illness   Source of history provided by:  patient. History/Exam Limitations: none. Juventino Haines is a 63 y.o. female who presents to the ED by private car for cough, beginning 2 weeks ago and are unchanged since that time. The complaint has been persistent, moderate in severity. Patient complains of productive cough for 2 weeks. States production is clear to white. States positive chills and sweats but unknown fever because she does not have a thermometer at home. States she was taking Tylenol and then shortly after would break out in a sweat. States she has not taken Tylenol for days. Went to her doctor today and they advised she come to the emergency department evaluation for possible pneumonia. States chest \"tightness\" with cough but no other time. Denies abdominal pain, nausea, vomiting, diarrhea. Patient does have history of COPD. States her albuterol inhaler for asthma has been utilized more frequently over the past 2 weeks. States she did see her doctor when this first began and they prescribed steroids but her insurance would not cover it so instead she was prescribed Mucinex DM. States she had a reaction to the Mucinex DM at home where she felt like her throat was closing. Patient advised to avoid use of Mucinex DM in the future. Patient states she does smoke cigarettes for 50 years. States up until a few years ago she smoked 4 cartons of cigarettes per week. That is 800 cigarettes/week. States now she smokes approximately 1/4 pack a day. Denies nausea, vomiting, diarrhea, dizziness, syncope, hemoptysis, hematemesis.       ROS    Pertinent positives and negatives are stated within HPI, all other systems reviewed and are negative. has a past medical history of ADHD (attention deficit hyperactivity disorder), Allergic rhinitis, Anxiety, Asthma, Atrial fibrillation ([address] CAD (coronary artery disease), Carpal tunnel syndrome, Chronic back pain, COPD (chronic obstructive pulmonary disease) ([facility] [address] Depression, Diabetes mellitus ([facility] [address] Diverticul disease small and large intestine, no perforati or abscess, Emphysema of lung ([address] Fibromyalgia, Gall stones, Gall stones, GERD (gastroesophageal reflux disease), Grave's disease, Headache(784.0), Hiatal hernia, History of rheumatic fever, History of scarlet fever, Hyperlipidemia, Hypertension, Irritable bowel syndrome, Osteoarthritis, Restless legs syndrome, Sleep apnea, SVT, Tobacco abuse, and Urinary incontinence. has a past surgical history that includes Atrial ablation surgery (1/12/00); Diagnostic Cardiac Cath Lab Procedure (9/28/95); Diagnostic Cardiac Cath Lab Procedure (9/16/99); Diagnostic Cardiac Cath Lab Procedure (9/23/03); Cholecystectomy; Hysterectomy; Wrist surgery (6/95); Appendectomy; and Colonoscopy. Social History:  reports that she has been smoking cigarettes. She has been smoking about 0.25 packs per day. She has never used smokeless tobacco. She reports current alcohol use. She reports that she does not use drugs. Family History: family history includes Diabetes in her mother. Allergies: Antihistamines, diphenhydramine-type; Asa [aspirin]; Bee venom; Codeine; Pcn [penicillins]; and Sulfa antibiotics    Physical Exam   Oxygen Saturation Interpretation: Normal.   ED Triage Vitals   BP Temp Temp src Pulse Resp SpO2 Height Weight   11/06/20 1150 11/06/20 1138 -- 11/06/20 1138 11/06/20 1138 11/06/20 1138 -- --   137/80 97.7 °F (36.5 °C)  59 18 95 %         Physical Exam  · Constitutional/General: Alert and oriented x3, well appearing, non toxic  · HEENT:  NC/NT. PERRLA,  Airway patent.   · Neck: Supple, E9/L    Eosinophils Absolute 0.13 0.05 - 0.50 E9/L    Basophils Absolute 0.06 0.00 - 0.20 E9/L   Comprehensive Metabolic Panel   Result Value Ref Range    Sodium 137 132 - 146 mmol/L    Potassium 4.1 3.5 - 5.0 mmol/L    Chloride 102 98 - 107 mmol/L    CO2 26 22 - 29 mmol/L    Anion Gap 9 7 - 16 mmol/L    Glucose 100 (H) 74 - 99 mg/dL    BUN 15 8 - 23 mg/dL    CREATININE 1.0 0.5 - 1.0 mg/dL    GFR Non-African American 56 >=60 mL/min/1.73    GFR African American >60     Calcium 9.3 8.6 - 10.2 mg/dL    Total Protein 7.6 6.4 - 8.3 g/dL    Alb 4.0 3.5 - 5.2 g/dL    Total Bilirubin 0.9 0.0 - 1.2 mg/dL    Alkaline Phosphatase 90 35 - 104 U/L    ALT 6 0 - 32 U/L    AST 13 0 - 31 U/L   Brain Natriuretic Peptide   Result Value Ref Range    Pro-BNP 91 0 - 125 pg/mL   Magnesium   Result Value Ref Range    Magnesium 2.3 1.6 - 2.6 mg/dL   EKG 12 Lead   Result Value Ref Range    Ventricular Rate 56 BPM    Atrial Rate 56 BPM    P-R Interval 172 ms    QRS Duration 100 ms    Q-T Interval 452 ms    QTc Calculation (Bazett) 436 ms    P Axis 62 degrees    R Axis 34 degrees    T Axis 7 degrees           EKG #1:  Interpreted by emergency department physician unless otherwise noted. Time: 1209 hrs. Rate: 56 bpm  Rhythm: Sinus. Interpretation: sinus bradycardia, otherwise normal EKG. Imaging: All Radiology results interpreted by Radiologist unless otherwise noted. XR CHEST (2 VW)   Final Result   No acute process. ED Course / Medical Decision Making     Medications   ipratropium-albuterol (DUONEB) nebulizer solution 1 ampule (1 ampule Inhalation Given 11/6/20 1231)        Re-examination:  11/6/20       Time: 1240 hrs. Respiratory symptoms relieved with DuoNeb    Consult(s):   None    Procedure(s):   none    MDM:   Patient complains of cough with production for approximately 2 weeks. Patient sent here by primary care for evaluation of pneumonia. Patient does have history of COPD.   Patient also has history of asthma. No evidence for acute findings on chest x-ray. No change in sense of smell or taste. No evidence for COVID-19. Patient given DuoNeb with relief in the emergency department. Patient discharged home with prescription for oral steroids. Patient symptoms likely exacerbation of COPD. Patient advised if symptoms persist or worsen to return to the emergency department immediately, otherwise follow-up with primary care provider. This patient also seen emergency department Dr. Nica Walden. Counseling:  Emergency Attending Physician and I reviewed today's visit with the patient in addition to providing specific details for the plan of care and counseling regarding the diagnosis and prognosis. Questions are answered at this time and are agreeable with the plan. Assessment      1. COPD exacerbation ([address]    2. Cough      Plan   Discharge to home  Patient condition is stable    New Medications     New Prescriptions    PREDNISONE (DELTASONE) 10 MG TABLET    Take 40mg po qd x 5 days  QS for 5 days     Electronically signed by SOCORRO Pérez CNP   DD: 11/6/20  **This report was transcribed using voice recognition software. Every effort was made to ensure accuracy; however, inadvertent computerized transcription errors may be present.   END OF ED PROVIDER NOTE          SOCORRO Molina CNP  11/06/20 8515

## 2020-11-06 NOTE — ED NOTES
FIRST PROVIDER CONTACT ASSESSMENT NOTE      Department of Emergency Medicine   Admit Date: No admission date for patient encounter. Chief Complaint: Cough (pt states that she went pmd for cough and runny nose, states that he sent her here to r/o pneumonia. )      History of Present Illness:    Melissa Mendoza is a 63 y.o. female who presents to the ED for cough, shortness of breath with chest tightness which she states is been present for the past 2 weeks. She was seen at [place] [address] and was told to come here for further evaluation. Denies any fever.   Denies any known exposure to the coronavirus.        -----------------END OF FIRST PROVIDER CONTACT ASSESSMENT NOTE--------------  Electronically signed by SOCORRO Tobin CNP   DD: 11/6/20               SOCORRO Carson CNP  11/06/20 1153

## 2020-11-07 ENCOUNTER — CARE COORDINATION (OUTPATIENT)
Dept: CARE COORDINATION | Age: 65
End: 2020-11-07

## 2020-11-07 NOTE — CARE COORDINATION
Date/Time:  11/7/2020 1:26 PM  Attempted to reach patient by telephone. Left HIPAA compliant message requesting a return call. Will attempt to reach patient again.

## 2021-06-09 ENCOUNTER — HOSPITAL ENCOUNTER (OUTPATIENT)
Dept: NEUROLOGY | Age: 66
Discharge: HOME | End: 2021-06-09
Payer: MEDICAID

## 2021-06-09 PROCEDURE — 95886 MUSC TEST DONE W/N TEST COMP: CPT | Performed by: PHYSICAL MEDICINE & REHABILITATION

## 2021-06-09 PROCEDURE — 95911 NRV CNDJ TEST 9-10 STUDIES: CPT | Performed by: PHYSICAL MEDICINE & REHABILITATION

## 2021-06-09 PROCEDURE — 95886 MUSC TEST DONE W/N TEST COMP: CPT

## 2021-06-09 PROCEDURE — 95911 NRV CNDJ TEST 9-10 STUDIES: CPT

## 2021-06-09 NOTE — PROCEDURES
[address] [facility]  [address], [address]  Phone: [phone]  Fax: [phone]      Referring Provider: Dixie Hernandez *  Primary Care Physician: Tish Ferrari DO  Patient Name: Trang Nugent  Patient YOB: 1955  Gender: female  BMI: 28.7  5'1\"  158lbs     6/9/2021    Description of clinical problem:   CC: leg numbness and pain    Pain Yes, Numbness/tingling  Yes; Weakness  Yes       Brief physical exam:   Sensory deficit No; Weakness No; Atrophy  No    Motor NCS      Nerve / Sites Lat. Amplitude Distance Velocity Temp.    ms mV cm m/s °C   R Peroneal - EDB      Ankle NR NR 8  32.6      Pop fossa NR NR 35 NR 32.6   L Peroneal - EDB      Ankle 5.36 3.5 8  32.2      Fib head 11.15 3.5 25 43 32.2      Pop fossa 13.18 3.5 10 49 32.2   R Tibial - AH      Ankle 3.65 9.1 8  32.1      Pop fossa 12.03 6.8 37 44 32.1   L Peroneal - Tib Ant      Fib Head 2.24 6.1   32.5      Pop fossa 4.79 7.7 10 39 32.5       Sensory NCS      Nerve / Sites Peak Lat PP Amp Distance Velocity Temp. ms µV cm m/s °C   R Superficial peroneal - Ankle      Lat leg 3.39 6.2 10 40 32   L Superficial peroneal - Ankle      Lat leg 3.54 9.2 10 35 32.4   R Sural - Ankle (Calf)      Calf 3.96 7.9 14 40 32   L Sural - Ankle (Calf)      Calf 3.70 9.9 14 55 32.4       F  Wave      Nerve F Lat M Lat F-M Lat    ms ms ms   R Tibial - AH 46.6 3.6 43.0   L Peroneal - EDB 51.1 5.5 45.6       H Reflex      Nerve Lat Hmax    ms   R Tibial - [place] 28.8   L Tibial - [place] 28.8       EMG         EMG Summary Table     Spontaneous MUAP Recruitment   Muscle IA Fib PSW Fasc H.F. Amp Dur. PPP Pattern   R. Tibialis anterior N None None None None N N 1+ Decr   R. Extensor hallucis longus N None None None None N N 1+ Decr   R.  Gastrocnemius (Medial head) N None None None None N N N Decr   R. Vastus medialis N None None None None N N N N   R. Gluteus medius N None None None None N N 1+ Decr   L. Tibialis anterior N None None None None N N N N   L. Extensor hallucis longus N None None None None N N N N   L. Gastrocnemius (Medial head) N None None None None N N N N   L. Vastus medialis N None None None None N N N N   R. Lumbar paraspinals (mid) N None None None None N N N N   R. Lumbar paraspinals (low) N None None None None N N 1+ Decr   L. Lumbar paraspinals (mid) N None None None None N N N N   L. Lumbar paraspinals (low) N None None None None N N N N         Study Limitations:  None     Summary of Findings:    1. Sensory nerve conduction studies of all nerves tested showed normal peak latencies, normal SNAP amplitudes, and normal conduction velocities. 2. Motor nerve conduction studies of the right peroneal nerve were absent. This is similar to prior exam in 2016. All other nerves tested showed normal distal latencies, normal CMAP amplitudes, and normal conduction velocities. 3. F-wave studies of all nerves tested revealed normal latencies. 4. Bilateral tibial nerve H-reflex responses were within normal limits. 5. EMG was performed with monopolar needle in multiple muscles outlined above, including the lumbar paraspinals. There were chronic neuropathic changes in the right L5 innervated muscles, including the lower lumbar paraspinals. All other muscles tested revealed normal insertional activity, without evidence of abnormal spontaneous activity. All MUAP's were of normal amplitude and duration with a full recruitment pattern. No increase in polyphasic potentials was noted. Diagnostic Interpretation: This study was abnormal.     1. There is electrodiagnostic evidence for a right L5 motor radiculopathy, axonal in nature without active denervation. It is mild severity, chronic in duration. Prognosis for axonal lesions is poor and dependent upon collateral sprouting.      2. There is no electrodiagnostic evidence for any peripheral nerve mononeuropathy, plexopathy, other motor radiculopathy or large fiber peripheral polyneuropathy. Sensory radiculopathy cannot be detected by EMG. Small fiber neuropathy cannot be detected by EMG. Recommended correlation with lumbar MRI. Previous Study: 2016- Dr. Narendra Cormier- mild right L5 radiculopathy. Findings were similar. Follow up EMG is recommended if clinically indicated. Technologist: Alek Troncoso    Physician: Jordana Dee DO, [address] [place] [address]   Board Certified Physical Medicine and Rehabilitation      Nerve conduction studies and electromyography were performed according to our laboratory policies and procedures which can be provided upon request. All abnormal values are identified in the table.  Laboratory normal values can also be provided upon request.       Cc: Peter Alarcon DO

## 2021-07-07 ENCOUNTER — HOSPITAL ENCOUNTER (OUTPATIENT)
Dept: GENERAL RADIOLOGY | Age: 66
Discharge: HOME | End: 2021-07-09
Payer: MEDICAID

## 2021-07-07 DIAGNOSIS — R13.10 DYSPHAGIA, UNSPECIFIED TYPE: ICD-10-CM

## 2021-07-07 DIAGNOSIS — R11.10 VOMITING, INTRACTABILITY OF VOMITING NOT SPECIFIED, PRESENCE OF NAUSEA NOT SPECIFIED, UNSPECIFIED VOMITING TYPE: ICD-10-CM

## 2021-07-07 PROCEDURE — 74230 X-RAY XM SWLNG FUNCJ C+: CPT

## 2021-07-07 PROCEDURE — 2500000003 HC RX 250 WO HCPCS: Performed by: RADIOLOGY

## 2021-07-07 PROCEDURE — 92526 ORAL FUNCTION THERAPY: CPT

## 2021-07-07 PROCEDURE — 92611 MOTION FLUOROSCOPY/SWALLOW: CPT

## 2021-07-07 RX ADMIN — BARIUM SULFATE 15 ML: 400 PASTE ORAL at 13:12

## 2021-07-07 RX ADMIN — BARIUM SULFATE 15 ML: 0.81 POWDER, FOR SUSPENSION ORAL at 13:13

## 2021-07-07 RX ADMIN — BARIUM SULFATE 15 ML: 400 SUSPENSION ORAL at 13:12

## 2021-07-07 NOTE — PROGRESS NOTES
SPEECH/LANGUAGE PATHOLOGY  VIDEOFLUOROSCOPIC STUDY OF SWALLOWING (MBS)   and PLAN OF CARE    PATIENT NAME:  Melissa Franco  (female)     MRN:  [id number]    :  1955  (61 y.o.)  STATUS:  Outpatient    TODAY'S DATE:  2021  REFERRING PROVIDER:   Dr. Landry Doll: [place] modified barium swallow with video  Date of order:  21   REASON FOR REFERRAL: dysphagia   EVALUATING THERAPIST: WILFRIDO Morrell      RESULTS:      DYSPHAGIA DIAGNOSIS:  mild  pharyngeal phase dysphagia      DIET RECOMMENDATIONS:  Regular consistency solids with  thin liquids    FEEDING RECOMMENDATIONS:    Assistance level:  No assistance needed     Compensatory strategies recommended: Small bites/sips and No straw     Discussed recommendations with nursing and/or faxed report to referring provider: Yes    SPEECH THERAPY  PLAN OF CARE   The dysphagia POC is established based on physician order and dysphagia diagnosis    Dysphagia therapy is not recommended       Conditions Requiring Skilled Therapeutic Intervention for dysphagia:    not applicable    SPECIFIC DYSPHAGIA INTERVENTIONS TO INCLUDE:     Not applicable    Specific instructions for next treatment:  not applicable   Treatment Goals:    Short Term Goals:  Not applicable no therapy warranted     Long Term Goals:   Not applicable no therapy warranted      Patient/family Goal:    Did not state. Will further assess during treatment.     Plan of care discussed with Patient                     ADMITTING DIAGNOSIS: Dysphagia, unspecified type [R13.10]  Vomiting, intractability of vomiting not specified, presence of nausea not specified, unspecified vomiting type [R11.10]     VISIT DIAGNOSIS:   Visit Diagnoses       Codes    Dysphagia, unspecified type     R13.10    Vomiting, intractability of vomiting not specified, presence of nausea not specified, unspecified vomiting type     R11.10              PATIENT REPORT/COMPLAINT: food sticking in her throat    PRIOR LEVEL OF SWALLOW FUNCTION:    Past History of Dysphagia?:  none reported    Home diet: Regular consistency solids with  thin liquids    PROCEDURE:  Consistencies Administered During the Evaluation   Liquids: thin liquid and nectar thick liquid   Solids:  pureed foods and solid foods      Method of Intake:   cup, straw, spoon  Self fed, Fed by clinician      Position:   Standing, Lateral plane    CLINICAL ASSESSMENT:    ORAL PREPARATION PHASE:    Within functional limits    ORAL PHASE:   Within functional limits    PHARYNGEAL PHASE:     ONSET TIME       Onset time of the pharyngeal swallow was adequate       PHARYNGEAL RESIDUALS        Vallecula/Pharyngeal Wall           No significant residuals were noted in the vallecula      Pyriform Sinuses      Residuals in the pyriform sinuses were noted due to pharyngeal weakness for pureed foods and solid foods  which  cleared with spontaneous double swallow and liquid chaser      LARYNGEAL PENETRATION   Laryngeal penetration occurred in the absence of aspiration DURING the swallow for thin liquid and nectar consistency liquid due to  delayed laryngeal closure which cleared from the laryngeal vestibule spontaneously (transient).  Laryngeal penetration was mild and occurred only with use of a straw and large volume    an absent cough/throat clear was noted    ASPIRATION  Aspiration was not present during this evaluation    PENETRATION-ASPIRATION SCALE (PAS):  THIN 2 = Material enters the airway, remains above the vocal folds, and is  ejected from the airway  MILDLY THICK 2 = Material enters the airway, remains above vocal folds, and is ejected from the airway   MODERATELY THICK item not administered  PUREE 1 = Material does not enter the airway  HARD SOLID 1 = Material does not enter the airway       COMPENSATORY STRATEGIES    Compensatory strategies that were beneficial included Small bites/sips and No straw      STRUCTURAL/FUNCTIONAL ANOMALIES   No structural/functional anomalies were noted    CERVICAL ESOPHAGEAL STAGE :     The cervical esophagus appeared adequate          ___________    Cognition:   Within functional limits for this exam    Oral Peripheral Examination   Generalized oral weakness    Current Respiratory Status   room air     Parameters of Speech Production  Respiration:  Adequate for speech production  Quality:   Within functional limits  Intensity: Within functional limits    Pain: No pain reported. EDUCATION:   The Speech Language Pathologist (SLP) completed education regarding results of evaluation and that intervention is not warranted at this time. Learner: Patient  Education: Reviewed results and recommendations of this evaluation  Evaluation of Education:  Suzie Ceja understanding    This plan may be re-evaluated and revised as warranted. Evaluation Time includes thorough review of current medical information, gathering information on past medical history/social history and prior level of function, completion of standardized testing/informal observation of tasks, assessment of data and education on plan of care and goals. [x]The admitting diagnosis and active problem list, have been reviewed prior to initiation of this evaluation.     CPT Code: 88759  dysphagia study    ACTIVE PROBLEM LIST:   Patient Active Problem List   Diagnosis    Chest pain    Palpitations    CAD (coronary artery disease)    History of atrial flutter    S/P ablation of atrial flutter    Tobacco abuse    Hx-TIA (transient ischemic attack)    Dyspnea    Dyslipidemia    Sinus bradycardia    COPD (chronic obstructive pulmonary disease) (HCC)    Primary osteoarthritis of right knee    Primary osteoarthritis of left knee    Essential hypertension    Lumbar radiculopathy    PVD (peripheral vascular disease) ([facility] [address]

## 2021-10-13 ENCOUNTER — TELEPHONE (OUTPATIENT)
Dept: VASCULAR SURGERY | Age: 66
End: 2021-10-13

## 2021-10-13 NOTE — TELEPHONE ENCOUNTER
Left message to reschedule U/S LE Arterial  testing for 11-1-2021 as we have closed our lab. Need to moved Dr justice to after testing.

## 2021-10-27 ENCOUNTER — TELEPHONE (OUTPATIENT)
Dept: VASCULAR SURGERY | Age: 66
End: 2021-10-27

## 2021-10-27 DIAGNOSIS — I73.9 PVD (PERIPHERAL VASCULAR DISEASE) WITH CLAUDICATION (HCC): Primary | ICD-10-CM

## 2021-10-27 NOTE — TELEPHONE ENCOUNTER
Notified pt of testing for 12- for Suzette Lack arterial study at [address] ELINA muhammad arrive at 10:30 am testing is at 11 am.   Dr justice is for 12- at 10:30.

## 2021-10-27 NOTE — TELEPHONE ENCOUNTER
Called to reschedule her Le Arterial study for 11-1-2021 with Dr Diana Hannon to another facility as we have closed our lab. [facility]. We will  move her dr appointment according to her testing date.

## 2021-12-13 ENCOUNTER — HOSPITAL ENCOUNTER (OUTPATIENT)
Dept: INTERVENTIONAL RADIOLOGY/VASCULAR | Age: 66
Discharge: HOME | End: 2021-12-15
Payer: MEDICAID

## 2021-12-13 DIAGNOSIS — I73.9 PVD (PERIPHERAL VASCULAR DISEASE) WITH CLAUDICATION (HCC): ICD-10-CM

## 2021-12-13 PROCEDURE — 93923 UPR/LXTR ART STDY 3+ LVLS: CPT

## 2022-04-12 ENCOUNTER — HOSPITAL ENCOUNTER (OUTPATIENT)
Dept: GENERAL RADIOLOGY | Age: 67
Discharge: HOME | End: 2022-04-14
Payer: MEDICAID

## 2022-04-12 VITALS — HEIGHT: 61 IN | WEIGHT: 151 LBS | BODY MASS INDEX: 28.51 KG/M2

## 2022-04-12 DIAGNOSIS — Z13.820 ENCOUNTER FOR IMAGING TO ASSESS OSTEOPOROSIS: ICD-10-CM

## 2022-04-12 PROCEDURE — 77080 DXA BONE DENSITY AXIAL: CPT

## 2022-05-21 ENCOUNTER — APPOINTMENT (OUTPATIENT)
Dept: GENERAL RADIOLOGY | Age: 67
End: 2022-05-21
Payer: MEDICAID

## 2022-05-21 ENCOUNTER — HOSPITAL ENCOUNTER (EMERGENCY)
Age: 67
Discharge: HOME | End: 2022-05-22
Attending: EMERGENCY MEDICINE
Payer: MEDICAID

## 2022-05-21 VITALS
RESPIRATION RATE: 17 BRPM | HEART RATE: 68 BPM | HEIGHT: 61 IN | BODY MASS INDEX: 30.21 KG/M2 | OXYGEN SATURATION: 96 % | TEMPERATURE: 97.5 F | WEIGHT: 160 LBS | DIASTOLIC BLOOD PRESSURE: 114 MMHG | SYSTOLIC BLOOD PRESSURE: 166 MMHG
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
## 2022-05-21 DIAGNOSIS — S61.412A LACERATION OF LEFT HAND WITHOUT FOREIGN BODY, INITIAL ENCOUNTER: ICD-10-CM

## 2022-05-21 PROCEDURE — 73130 X-RAY EXAM OF HAND: CPT

## 2022-05-21 PROCEDURE — 6360000002 HC RX W HCPCS: Performed by: STUDENT IN AN ORGANIZED HEALTH CARE EDUCATION/TRAINING PROGRAM

## 2022-05-21 PROCEDURE — 99284 EMERGENCY DEPT VISIT MOD MDM: CPT

## 2022-05-21 PROCEDURE — 96372 THER/PROPH/DIAG INJ SC/IM: CPT

## 2022-05-21 RX ORDER — METRONIDAZOLE 500 MG/1
500 TABLET ORAL ONCE
Status: COMPLETED | OUTPATIENT
Start: 2022-05-21 | End: 2022-05-22

## 2022-05-21 RX ORDER — DOXYCYCLINE HYCLATE 100 MG/1
100 CAPSULE ORAL ONCE
Status: COMPLETED | OUTPATIENT
Start: 2022-05-21 | End: 2022-05-22

## 2022-05-21 RX ORDER — FENTANYL CITRATE 50 UG/ML
25 INJECTION, SOLUTION INTRAMUSCULAR; INTRAVENOUS ONCE
Status: COMPLETED | OUTPATIENT
Start: 2022-05-21 | End: 2022-05-21

## 2022-05-21 RX ADMIN — FENTANYL CITRATE 25 MCG: 50 INJECTION, SOLUTION INTRAMUSCULAR; INTRAVENOUS at 22:21

## 2022-05-21 ASSESSMENT — PAIN DESCRIPTION - DESCRIPTORS: DESCRIPTORS: BURNING;GNAWING

## 2022-05-21 ASSESSMENT — PAIN SCALES - GENERAL: PAINLEVEL_OUTOF10: 10

## 2022-05-21 ASSESSMENT — ENCOUNTER SYMPTOMS
COUGH: 0
FACIAL SWELLING: 0
BACK PAIN: 0
NAUSEA: 0
DIARRHEA: 0
VOMITING: 0
TROUBLE SWALLOWING: 0
SHORTNESS OF BREATH: 0
ABDOMINAL PAIN: 0
COLOR CHANGE: 1

## 2022-05-21 ASSESSMENT — PAIN DESCRIPTION - LOCATION: LOCATION: HAND

## 2022-05-21 ASSESSMENT — PAIN DESCRIPTION - ORIENTATION: ORIENTATION: RIGHT

## 2022-05-22 PROCEDURE — 6370000000 HC RX 637 (ALT 250 FOR IP): Performed by: STUDENT IN AN ORGANIZED HEALTH CARE EDUCATION/TRAINING PROGRAM

## 2022-05-22 RX ORDER — DOXYCYCLINE HYCLATE 100 MG
100 TABLET ORAL 2 TIMES DAILY
Qty: 20 TABLET | Refills: 0 | Status: SHIPPED | OUTPATIENT
Start: 2022-05-22 | End: 2022-05-25 | Stop reason: ALTCHOICE

## 2022-05-22 RX ORDER — METRONIDAZOLE 500 MG/1
500 TABLET ORAL 2 TIMES DAILY
Qty: 20 TABLET | Refills: 0 | Status: SHIPPED | OUTPATIENT
Start: 2022-05-22 | End: 2022-06-01

## 2022-05-22 RX ORDER — TETANUS AND DIPHTHERIA TOXOIDS ADSORBED 2; 2 [LF]/.5ML; [LF]/.5ML
INJECTION INTRAMUSCULAR
Status: DISCONTINUED
Start: 2022-05-22 | End: 2022-05-22 | Stop reason: WASHOUT

## 2022-05-22 RX ADMIN — METRONIDAZOLE 500 MG: 500 TABLET ORAL at 01:26

## 2022-05-22 RX ADMIN — DOXYCYCLINE 100 MG: 100 CAPSULE ORAL at 01:26

## 2022-05-22 NOTE — ED PROVIDER NOTES
Chief Complaint   Patient presents with    Arm Injury     Pt stating her two cats were fighting and she tried to seperate them. Pt stating her cats scratched and bit her on the right hand. +bleeding, can visualize subcutaneous tissue. Pt A&Ox4, no other complaints. unknown last tetanus       Patient is a 53-year-old female presents today with bilateral hand injury. She states she was try to separate 2 of her cats from fighting, and she was bit multiple times on the left in the right hand. Right hand did sustain more injuries. She is also endorsing pain to this area. Symptom started shortly prior to arrival.  Symptoms are persistent and moderate in severity. She has normal active and passive range of motion. Denies numbness or weakness. Unsure of tetanus status. She is allergic to penicillin. She has no other complaints, wounds or injuries at this time. The history is provided by the patient. No  was used. Review of Systems   Constitutional: Negative for chills, diaphoresis and fever. HENT: Negative for facial swelling and trouble swallowing. Respiratory: Negative for cough and shortness of breath. Cardiovascular: Negative for chest pain. Gastrointestinal: Negative for abdominal pain, diarrhea, nausea and vomiting. Musculoskeletal: Negative for back pain. Skin: Positive for color change and wound. Neurological: Negative for dizziness, syncope, weakness, numbness and headaches. Hematological: Negative for adenopathy. Psychiatric/Behavioral: Negative for behavioral problems and confusion. Physical Exam  Vitals and nursing note reviewed. Constitutional:       General: She is not in acute distress. Appearance: Normal appearance. She is well-developed. She is not ill-appearing. HENT:      Head: Normocephalic and atraumatic. Eyes:      Pupils: Pupils are equal, round, and reactive to light.    Cardiovascular:      Rate and Rhythm: Normal rate and regular rhythm. Pulses: Normal pulses. Comments: Radial pulses intact  Pulmonary:      Effort: Pulmonary effort is normal. No respiratory distress. Breath sounds: Normal breath sounds. No wheezing or rales. Abdominal:      General: Bowel sounds are normal.      Palpations: Abdomen is soft. Tenderness: There is no abdominal tenderness. There is no guarding or rebound. Musculoskeletal:         General: Tenderness present. Cervical back: Normal range of motion and neck supple. Comments: Normal active and passive range of motion   Skin:     General: Skin is warm and dry. Capillary Refill: Capillary refill takes less than 2 seconds. Findings: Lesion present. Comments: Multiple puncture wounds to the dorsal and ventral aspect of the left and right hand    Neurological:      General: No focal deficit present. Mental Status: She is alert and oriented to person, place, and time. Cranial Nerves: No cranial nerve deficit. Sensory: No sensory deficit. Motor: No weakness. Coordination: Coordination normal.      Comments: Normal active and passive range of motion  Sensations intact          Procedures     PROCEDURE NOTE  5/22/22         LACERATION REPAIR  Risks, benefits and alternatives (for applicable procedures below) described. Performed By: Rylan Hawkins DO. Informed consent: Verbal consent obtained. The patient was counseled regarding the procedure in person, it's indications, risks, potential complications and alternatives and any questions were answered. Verbal consent was obtained. Laceration #: 1. Location: right hand  Length: 3cm. The wound area was irrigated with sterile saline and draped in a sterile fashion. Local Anesthesia:  obtained with Lidocaine 1% without epinephrine. The wound was explored with the following results: Thickness: superficial. no foreign body or tendon injury seen. Debridement: None.   Undermining: None.  Wound Margins Revised: None. Flaps Aligned: no. The wound was closed in single layer closure with #3  4-0 Ethilon using interrupted suture(s). Dressing:  bacitracin. There were no additional wounds requiring formal closure. Labs Reviewed - No data to display  XR HAND RIGHT (MIN 3 VIEWS)   Final Result   1. No acute osseous findings about the left nor right hand. Sequela of   prior trauma to the distal right radius. Overall alignment is anatomic. 2.  Soft tissue swelling and subcutaneous air adjacent to the right thumb   metacarpal.      3.  Bilateral wrist and hand osteoarthritis. Osseous mineralization is   decreased. XR HAND LEFT (MIN 3 VIEWS)   Final Result   1. No acute osseous findings about the left nor right hand. Sequela of   prior trauma to the distal right radius. Overall alignment is anatomic. 2.  Soft tissue swelling and subcutaneous air adjacent to the right thumb   metacarpal.      3.  Bilateral wrist and hand osteoarthritis. Osseous mineralization is   decreased. MDM  Number of Diagnoses or Management Options  Cat bite, initial encounter  Laceration of left hand without foreign body, initial encounter  Diagnosis management comments: Patient is a 43-year-old female presents again with multiple puncture wounds and a laceration to the left and right hand after cat bite. X-rays show no acute fractures or dislocations. Area was well irrigated. Pain control with lidocaine. We are initially going to give Augmentin, however patient is allergic to penicillins, therefore patient was given Flagyl and Doxy. Tetanus was updated. Area was loosely approximated. With benign work-up she will be discharged home instructed to follow-up with PCP. Return precautions given and. Instructed to have sutures removed in 7 to 10 days. She is neurologically and neurovascular intact.        Amount and/or Complexity of Data Reviewed  Tests in the radiology section of CPT®: reviewed                --------------------------------------------- PAST HISTORY ---------------------------------------------  Past Medical History:  has a past medical history of ADHD (attention deficit hyperactivity disorder), Allergic rhinitis, Anxiety, Asthma, Atrial fibrillation ([facility] [address] CAD (coronary artery disease), Carpal tunnel syndrome, Chronic back pain, COPD (chronic obstructive pulmonary disease) ([facility] [address] Depression, Diabetes mellitus ([facility] [address] Diverticul disease small and large intestine, no perforati or abscess, Emphysema of lung ([facility] [address] Fibromyalgia, Gall stones, Gall stones, GERD (gastroesophageal reflux disease), Grave's disease, Headache(784.0), Hiatal hernia, History of rheumatic fever, History of scarlet fever, Hyperlipidemia, Hypertension, Irritable bowel syndrome, Osteoarthritis, Restless legs syndrome, Sleep apnea, SVT, Tobacco abuse, and Urinary incontinence. Past Surgical History:  has a past surgical history that includes Atrial ablation surgery (1/12/00); Diagnostic Cardiac Cath Lab Procedure (9/28/95); Diagnostic Cardiac Cath Lab Procedure (9/16/99); Diagnostic Cardiac Cath Lab Procedure (9/23/03); Cholecystectomy; Hysterectomy; Wrist surgery (6/95); Appendectomy; and Colonoscopy. Social History:  reports that she has been smoking cigarettes. She has been smoking about 0.25 packs per day. She has never used smokeless tobacco. She reports current alcohol use. She reports that she does not use drugs. Family History: family history includes Diabetes in her mother. The patients home medications have been reviewed. Allergies: Antihistamines, diphenhydramine-type; Asa [aspirin]; Bee venom; Codeine; Pcn [penicillins]; and Sulfa antibiotics    -------------------------------------------------- RESULTS -------------------------------------------------  Labs:  No results found for this visit on 05/21/22. Radiology:  XR HAND RIGHT (MIN 3 VIEWS)   Final Result   1.   No acute osseous findings about the left nor right hand. Sequela of   prior trauma to the distal right radius. Overall alignment is anatomic. 2.  Soft tissue swelling and subcutaneous air adjacent to the right thumb   metacarpal.      3.  Bilateral wrist and hand osteoarthritis. Osseous mineralization is   decreased. XR HAND LEFT (MIN 3 VIEWS)   Final Result   1. No acute osseous findings about the left nor right hand. Sequela of   prior trauma to the distal right radius. Overall alignment is anatomic. 2.  Soft tissue swelling and subcutaneous air adjacent to the right thumb   metacarpal.      3.  Bilateral wrist and hand osteoarthritis. Osseous mineralization is   decreased. ------------------------- NURSING NOTES AND VITALS REVIEWED ---------------------------  Date / Time Roomed:  5/21/2022  9:38 PM  ED Bed Assignment:  ST02/ST-02    The nursing notes within the ED encounter and vital signs as below have been reviewed. BP (!) 166/114   Pulse 68   Temp 97.5 °F (36.4 °C) (Oral)   Resp 17   Ht 5' 1\" (1.549 m)   Wt 160 lb (72.6 kg)   SpO2 96%   BMI 30.23 kg/m²   Oxygen Saturation Interpretation: Normal      ------------------------------------------ PROGRESS NOTES ------------------------------------------  I have spoken with the patient and discussed todays results, in addition to providing specific details for the plan of care and counseling regarding the diagnosis and prognosis. Their questions are answered at this time and they are agreeable with the plan. I discussed at length with them reasons for immediate return here for re evaluation. They will followup with primary care by calling their office tomorrow. --------------------------------- ADDITIONAL PROVIDER NOTES ---------------------------------  At this time the patient is without objective evidence of an acute process requiring hospitalization or inpatient management.   They have remained hemodynamically stable throughout their entire ED visit and are stable for discharge with outpatient follow-up. The plan has been discussed in detail and they are aware of the specific conditions for emergent return, as well as the importance of follow-up. New Prescriptions    DOXYCYCLINE HYCLATE (VIBRA-TABS) 100 MG TABLET    Take 1 tablet by mouth 2 times daily for 10 days    METRONIDAZOLE (FLAGYL) 500 MG TABLET    Take 1 tablet by mouth 2 times daily for 10 days       Diagnosis:  1. Cat bite, initial encounter    2. Laceration of left hand without foreign body, initial encounter        Disposition:  Patient's disposition: Discharge to home  Patient's condition is stable. Joe Richards DO  Resident  05/22/22 [id number]  ATTENDING PROVIDER ATTESTATION:     I have personally performed and/or participated in the history, exam, medical decision making, and procedures and agree with all pertinent clinical information. I have also reviewed and agree with the past medical, family and social history unless otherwise noted. I have discussed this patient in detail with the resident, and provided the instruction and education regarding cat bite. My findings/Plan: I Was the primary provider for patient. Patient presenting here because of cat bite. Patient was cut by her 2 cats that were fighting. Patient reports on her right hand as well as her left hand. There is no noted laceration to the extensor surface below the thumb on the right side. Patient able to extend and flex the fingers. Patient reporting no head injury no neck or back pain no chest pain no abdominal pain. Heart and lung exam normal abdomen soft nontender patient moving all extremities. Lacerations were cleansed here patient had 3 sutures placed in the right hand. Patient will be placed on a medication. Patient will be discharged home.        Briscoe Olszewski, MD  05/22/22 2805 Felicitas Whitley MD  05/22/22 2801 Felicitas Whitley MD  05/22/22 [id number]

## 2022-05-25 ENCOUNTER — APPOINTMENT (OUTPATIENT)
Dept: GENERAL RADIOLOGY | Age: 67
End: 2022-05-25
Payer: MEDICAID

## 2022-05-25 ENCOUNTER — HOSPITAL ENCOUNTER (EMERGENCY)
Age: 67
Discharge: HOME | End: 2022-05-25
Attending: STUDENT IN AN ORGANIZED HEALTH CARE EDUCATION/TRAINING PROGRAM
Payer: MEDICAID

## 2022-05-25 VITALS
TEMPERATURE: 98 F | OXYGEN SATURATION: 97 % | SYSTOLIC BLOOD PRESSURE: 154 MMHG | BODY MASS INDEX: 28.91 KG/M2 | WEIGHT: 153 LBS | DIASTOLIC BLOOD PRESSURE: 97 MMHG | RESPIRATION RATE: 18 BRPM | HEART RATE: 74 BPM
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
## 2022-05-25 LAB
ALBUMIN SERPL-MCNC: 4.2 G/DL (ref 3.5–5.2)
ALP BLD-CCNC: 100 U/L (ref 35–104)
ALT SERPL-CCNC: 12 U/L (ref 0–32)
ANION GAP SERPL CALCULATED.3IONS-SCNC: 12 MMOL/L (ref 7–16)
AST SERPL-CCNC: 14 U/L (ref 0–31)
BASOPHILS ABSOLUTE: 0.07 E9/L (ref 0–0.2)
BASOPHILS RELATIVE PERCENT: 0.9 % (ref 0–2)
BILIRUB SERPL-MCNC: <0.2 MG/DL (ref 0–1.2)
BUN BLDV-MCNC: 12 MG/DL (ref 6–23)
C-REACTIVE PROTEIN: 2.4 MG/DL (ref 0–0.4)
CALCIUM SERPL-MCNC: 9.2 MG/DL (ref 8.6–10.2)
CHLORIDE BLD-SCNC: 97 MMOL/L (ref 98–107)
CO2: 25 MMOL/L (ref 22–29)
CREAT SERPL-MCNC: 0.9 MG/DL (ref 0.5–1)
EOSINOPHILS ABSOLUTE: 0.2 E9/L (ref 0.05–0.5)
EOSINOPHILS RELATIVE PERCENT: 2.4 % (ref 0–6)
GFR AFRICAN AMERICAN: >60
GFR NON-AFRICAN AMERICAN: >60 ML/MIN/1.73
GLUCOSE BLD-MCNC: 98 MG/DL (ref 74–99)
HCT VFR BLD CALC: 46.1 % (ref 34–48)
HEMOGLOBIN: 14.8 G/DL (ref 11.5–15.5)
IMMATURE GRANULOCYTES #: 0.04 E9/L
IMMATURE GRANULOCYTES %: 0.5 % (ref 0–5)
LACTIC ACID, SEPSIS: 1 MMOL/L (ref 0.5–1.9)
LYMPHOCYTES ABSOLUTE: 2.59 E9/L (ref 1.5–4)
LYMPHOCYTES RELATIVE PERCENT: 31.5 % (ref 20–42)
MCH RBC QN AUTO: 30.6 PG (ref 26–35)
MCHC RBC AUTO-ENTMCNC: 32.1 % (ref 32–34.5)
MCV RBC AUTO: 95.4 FL (ref 80–99.9)
MONOCYTES ABSOLUTE: 0.48 E9/L (ref 0.1–0.95)
MONOCYTES RELATIVE PERCENT: 5.8 % (ref 2–12)
NEUTROPHILS ABSOLUTE: 4.84 E9/L (ref 1.8–7.3)
NEUTROPHILS RELATIVE PERCENT: 58.9 % (ref 43–80)
PDW BLD-RTO: 13.5 FL (ref 11.5–15)
PLATELET # BLD: 384 E9/L (ref 130–450)
PMV BLD AUTO: 9.4 FL (ref 7–12)
POTASSIUM REFLEX MAGNESIUM: 4.1 MMOL/L (ref 3.5–5)
RBC # BLD: 4.83 E12/L (ref 3.5–5.5)
SEDIMENTATION RATE, ERYTHROCYTE: 45 MM/HR (ref 0–20)
SODIUM BLD-SCNC: 134 MMOL/L (ref 132–146)
TOTAL PROTEIN: 8 G/DL (ref 6.4–8.3)
WBC # BLD: 8.2 E9/L (ref 4.5–11.5)

## 2022-05-25 PROCEDURE — 96375 TX/PRO/DX INJ NEW DRUG ADDON: CPT

## 2022-05-25 PROCEDURE — 6360000002 HC RX W HCPCS: Performed by: STUDENT IN AN ORGANIZED HEALTH CARE EDUCATION/TRAINING PROGRAM

## 2022-05-25 PROCEDURE — 87040 BLOOD CULTURE FOR BACTERIA: CPT

## 2022-05-25 PROCEDURE — 99284 EMERGENCY DEPT VISIT MOD MDM: CPT

## 2022-05-25 PROCEDURE — 85025 COMPLETE CBC W/AUTO DIFF WBC: CPT

## 2022-05-25 PROCEDURE — 80053 COMPREHEN METABOLIC PANEL: CPT

## 2022-05-25 PROCEDURE — 96365 THER/PROPH/DIAG IV INF INIT: CPT

## 2022-05-25 PROCEDURE — 85651 RBC SED RATE NONAUTOMATED: CPT

## 2022-05-25 PROCEDURE — 2580000003 HC RX 258: Performed by: STUDENT IN AN ORGANIZED HEALTH CARE EDUCATION/TRAINING PROGRAM

## 2022-05-25 PROCEDURE — 83605 ASSAY OF LACTIC ACID: CPT

## 2022-05-25 PROCEDURE — 73130 X-RAY EXAM OF HAND: CPT

## 2022-05-25 PROCEDURE — 86140 C-REACTIVE PROTEIN: CPT

## 2022-05-25 RX ORDER — LEVOFLOXACIN 750 MG/1
750 TABLET ORAL DAILY
Qty: 10 TABLET | Refills: 0 | Status: SHIPPED | OUTPATIENT
Start: 2022-05-25 | End: 2022-06-04

## 2022-05-25 RX ORDER — FENTANYL CITRATE 50 UG/ML
25 INJECTION, SOLUTION INTRAMUSCULAR; INTRAVENOUS ONCE
Status: COMPLETED | OUTPATIENT
Start: 2022-05-25 | End: 2022-05-25

## 2022-05-25 RX ORDER — HYDROCODONE BITARTRATE AND ACETAMINOPHEN 5; 325 MG/1; MG/1
1 TABLET ORAL EVERY 6 HOURS PRN
Qty: 8 TABLET | Refills: 0 | Status: SHIPPED | OUTPATIENT
Start: 2022-05-25 | End: 2022-05-27

## 2022-05-25 RX ADMIN — FENTANYL CITRATE 25 MCG: 50 INJECTION, SOLUTION INTRAMUSCULAR; INTRAVENOUS at 20:11

## 2022-05-25 RX ADMIN — ERTAPENEM SODIUM 1000 MG: 1 INJECTION, POWDER, LYOPHILIZED, FOR SOLUTION INTRAMUSCULAR; INTRAVENOUS at 20:12

## 2022-05-25 ASSESSMENT — PAIN DESCRIPTION - FREQUENCY: FREQUENCY: CONTINUOUS

## 2022-05-25 ASSESSMENT — PAIN DESCRIPTION - DESCRIPTORS: DESCRIPTORS: ACHING;BURNING

## 2022-05-25 ASSESSMENT — PAIN DESCRIPTION - PAIN TYPE: TYPE: ACUTE PAIN

## 2022-05-25 ASSESSMENT — PAIN - FUNCTIONAL ASSESSMENT: PAIN_FUNCTIONAL_ASSESSMENT: 0-10

## 2022-05-25 ASSESSMENT — PAIN SCALES - GENERAL: PAINLEVEL_OUTOF10: 10

## 2022-05-25 ASSESSMENT — PAIN DESCRIPTION - LOCATION: LOCATION: HAND

## 2022-05-25 ASSESSMENT — PAIN DESCRIPTION - ORIENTATION: ORIENTATION: RIGHT

## 2022-05-25 NOTE — Clinical Note
The patient has decided to leave against medical advice, because she declines admission. She has normal mental status and adequate capacity to make medical decisions. The patient refuses hospital admission and wants to be discharged. The ris ks have been explained to the patient, including worsening illness, chronic pain, permanent disability, and death. The benefits of admission have also been explained, including the availability and proximity of nurses, physicians, monitoring, diag nostic testing, and treatment. The patient was able to understand and state the risks and benefits of hospital admission. This was witnessed by nurse  and me. She had the opportunity to ask questions about her medical condition. The patient  was treated to the extent that she would allow, and knows that she may return for care at any time.

## 2022-05-26 NOTE — ED PROVIDER NOTES
Chung  is a 74year old female who presented to ED with concern for cellulitis and infection to her right hand. Patient was previously seen in ED for cellulitis and wound to the right hand. Patient was seen 5/21 tetanus updated per patient, patient owns cat and reported it is UTD on rabies. Patient did not  abx. The history is provided by the patient and medical records. Review of Systems   Constitutional: Negative for chills, diaphoresis, fatigue and fever. Eyes: Negative for photophobia and visual disturbance. Respiratory: Negative for cough, chest tightness and shortness of breath. Cardiovascular: Negative for chest pain, palpitations and leg swelling. Gastrointestinal: Negative for abdominal distention, abdominal pain, diarrhea, nausea and vomiting. Genitourinary: Negative for dysuria. Musculoskeletal: Negative for neck pain and neck stiffness. Skin: Positive for rash and wound. Negative for pallor. Neurological: Negative for headaches. Psychiatric/Behavioral: Negative for confusion. Physical Exam  Vitals and nursing note reviewed. Constitutional:       General: She is not in acute distress. Appearance: Normal appearance. She is not ill-appearing. HENT:      Head: Normocephalic and atraumatic. Eyes:      General: No scleral icterus. Conjunctiva/sclera: Conjunctivae normal.      Pupils: Pupils are equal, round, and reactive to light. Cardiovascular:      Rate and Rhythm: Normal rate and regular rhythm. Pulmonary:      Effort: Pulmonary effort is normal.      Breath sounds: Normal breath sounds. Abdominal:      General: Bowel sounds are normal. There is no distension. Palpations: Abdomen is soft. Tenderness: There is no abdominal tenderness. There is no guarding or rebound. Musculoskeletal:      Cervical back: Normal range of motion and neck supple. No rigidity. No muscular tenderness. Right lower leg: No edema.       Left lower leg: No edema. Skin:     General: Skin is warm and dry. Capillary Refill: Capillary refill takes less than 2 seconds. Coloration: Skin is not pale. Findings: No erythema or rash. Comments: See photo   Neurological:      Mental Status: She is alert and oriented to person, place, and time. Psychiatric:         Mood and Affect: Mood normal.          Procedures     MDM  Number of Diagnoses or Management Options  Cat bite, initial encounter  Diagnosis management comments: Abdiaziz Cordon is a 74year old female who presented to ED with concern for infected wound  Patient declined admission patient was given one dose of IV abx and changed doxycycline to Levaquin, patient encouraged to stay but declined, patient advised to return to ED if symptoms worsen or she decides to return to admission  Patient has capacity to make her own decisions, patient understands that signing out AMA could result in disability or death,.                     --------------------------------------------- PAST HISTORY ---------------------------------------------  Past Medical History:  has a past medical history of ADHD (attention deficit hyperactivity disorder), Allergic rhinitis, Anxiety, Asthma, Atrial fibrillation ([address] CAD (coronary artery disease), Carpal tunnel syndrome, Chronic back pain, COPD (chronic obstructive pulmonary disease) ([address] Depression, Diabetes mellitus ([address] Diverticul disease small and large intestine, no perforati or abscess, Emphysema of lung ([address] Fibromyalgia, Gall stones, Gall stones, GERD (gastroesophageal reflux disease), Grave's disease, Headache(784.0), Hiatal hernia, History of rheumatic fever, History of scarlet fever, Hyperlipidemia, Hypertension, Irritable bowel syndrome, Osteoarthritis, Restless legs syndrome, Sleep apnea, SVT, Tobacco abuse, and Urinary incontinence.     Past Surgical History:  has a past surgical history that includes Atrial ablation surgery (1/12/00); Diagnostic Cardiac Cath Lab Procedure (9/28/95); Diagnostic Cardiac Cath Lab Procedure (9/16/99); Diagnostic Cardiac Cath Lab Procedure (9/23/03); Cholecystectomy; Hysterectomy; Wrist surgery (6/95); Appendectomy; and Colonoscopy. Social History:  reports that she has been smoking cigarettes. She has been smoking about 0.25 packs per day. She has never used smokeless tobacco. She reports current alcohol use. She reports that she does not use drugs. Family History: family history includes Diabetes in her mother. The patients home medications have been reviewed. Allergies: Antihistamines, diphenhydramine-type; Asa [aspirin]; Bee venom;  Codeine; Pcn [penicillins]; and Sulfa antibiotics    -------------------------------------------------- RESULTS -------------------------------------------------  Labs:  Results for orders placed or performed during the hospital encounter of 05/25/22   Culture, Blood 1    Specimen: Blood   Result Value Ref Range    Blood Culture, Routine 24 Hours no growth    Culture, Blood 2    Specimen: Blood   Result Value Ref Range    Culture, Blood 2 24 Hours no growth    Sedimentation Rate   Result Value Ref Range    Sed Rate 45 (H) 0 - 20 mm/Hr   C-Reactive Protein   Result Value Ref Range    CRP 2.4 (H) 0.0 - 0.4 mg/dL   CBC with Auto Differential   Result Value Ref Range    WBC 8.2 4.5 - 11.5 E9/L    RBC 4.83 3.50 - 5.50 E12/L    Hemoglobin 14.8 11.5 - 15.5 g/dL    Hematocrit 46.1 34.0 - 48.0 %    MCV 95.4 80.0 - 99.9 fL    MCH 30.6 26.0 - 35.0 pg    MCHC 32.1 32.0 - 34.5 %    RDW 13.5 11.5 - 15.0 fL    Platelets 720 024 - 036 E9/L    MPV 9.4 7.0 - 12.0 fL    Neutrophils % 58.9 43.0 - 80.0 %    Immature Granulocytes % 0.5 0.0 - 5.0 %    Lymphocytes % 31.5 20.0 - 42.0 %    Monocytes % 5.8 2.0 - 12.0 %    Eosinophils % 2.4 0.0 - 6.0 %    Basophils % 0.9 0.0 - 2.0 %    Neutrophils Absolute 4.84 1.80 - 7.30 E9/L    Immature Granulocytes # 0.04 E9/L    Lymphocytes Absolute 2.59 1.50 - 4.00 E9/L    Monocytes Absolute 0.48 0.10 - 0.95 E9/L    Eosinophils Absolute 0.20 0.05 - 0.50 E9/L    Basophils Absolute 0.07 0.00 - 0.20 E9/L   Comprehensive Metabolic Panel w/ Reflex to MG   Result Value Ref Range    Sodium 134 132 - 146 mmol/L    Potassium reflex Magnesium 4.1 3.5 - 5.0 mmol/L    Chloride 97 (L) 98 - 107 mmol/L    CO2 25 22 - 29 mmol/L    Anion Gap 12 7 - 16 mmol/L    Glucose 98 74 - 99 mg/dL    BUN 12 6 - 23 mg/dL    CREATININE 0.9 0.5 - 1.0 mg/dL    GFR Non-African American >60 >=60 mL/min/1.73    GFR African American >60     Calcium 9.2 8.6 - 10.2 mg/dL    Total Protein 8.0 6.4 - 8.3 g/dL    Albumin 4.2 3.5 - 5.2 g/dL    Total Bilirubin <0.2 0.0 - 1.2 mg/dL    Alkaline Phosphatase 100 35 - 104 U/L    ALT 12 0 - 32 U/L    AST 14 0 - 31 U/L   Lactate, Sepsis   Result Value Ref Range    Lactic Acid, Sepsis 1.0 0.5 - 1.9 mmol/L       Radiology:  XR HAND LEFT (MIN 3 VIEWS)    Result Date: 5/21/2022  EXAMINATION: THREE XRAY VIEWS OF THE LEFT HAND; THREE XRAY VIEWS OF THE RIGHT HAND 5/21/2022 9:54 pm COMPARISON: None. HISTORY: ORDERING SYSTEM PROVIDED HISTORY: cat bites TECHNOLOGIST PROVIDED HISTORY: Reason for exam:->cat bites What reading provider will be dictating this exam?->CRC FINDINGS: LEFT HAND: Radiographs of the left hand show no fractures with preserved alignment. Osseous mineralization is decreased. Mild left wrist and thumb CMC/IP joint degenerative spurring and sclerosis. Similar changes to the PIP and DIP joints of the digits of the left hand. No erosive changes. No evidence of subcutaneous air or radiopaque foreign bodies. RIGHT HAND: Radiographs of the right hand show no acute fractures. Sequela of prior trauma to the distal right radius. Overall normal alignment. Osseous mineralization is decreased. Mild right wrist triscaphe and thumb CMC/IP joint degenerative spurring and sclerosis. Similar changes to the PIP and DIP joints of the right hand digits.   No radiopaque foreign bodies. Soft tissue swelling and subcutaneous air adjacent to the right thumb metacarpal.     1.  No acute osseous findings about the left nor right hand. Sequela of prior trauma to the distal right radius. Overall alignment is anatomic. 2.  Soft tissue swelling and subcutaneous air adjacent to the right thumb metacarpal. 3.  Bilateral wrist and hand osteoarthritis. Osseous mineralization is decreased. XR HAND RIGHT (MIN 3 VIEWS)    Result Date: 5/25/2022  EXAMINATION: THREE XRAY VIEWS OF THE RIGHT HAND 5/25/2022 5:43 pm COMPARISON: None. HISTORY: ORDERING SYSTEM PROVIDED HISTORY: pain TECHNOLOGIST PROVIDED HISTORY: Reason for exam:->pain What reading provider will be dictating this exam?->CRC FINDINGS: Soft tissue swelling seen along the dorsal aspect of the hand. There are degenerative changes identified of the carpal bones. There is no acute bony abnormality identified. Minimal degenerative changes seen in the interphalangeal joints with some joint space narrowing. Osteophyte formation present. Degenerative changes seen of the interphalangeal joints and carpal bones with no acute bony abnormality. XR HAND RIGHT (MIN 3 VIEWS)    Result Date: 5/21/2022  EXAMINATION: THREE XRAY VIEWS OF THE LEFT HAND; THREE XRAY VIEWS OF THE RIGHT HAND 5/21/2022 9:54 pm COMPARISON: None. HISTORY: ORDERING SYSTEM PROVIDED HISTORY: cat bites TECHNOLOGIST PROVIDED HISTORY: Reason for exam:->cat bites What reading provider will be dictating this exam?->CRC FINDINGS: LEFT HAND: Radiographs of the left hand show no fractures with preserved alignment. Osseous mineralization is decreased. Mild left wrist and thumb CMC/IP joint degenerative spurring and sclerosis. Similar changes to the PIP and DIP joints of the digits of the left hand. No erosive changes. No evidence of subcutaneous air or radiopaque foreign bodies. RIGHT HAND: Radiographs of the right hand show no acute fractures.   Sequela of prior trauma to the distal right radius. Overall normal alignment. Osseous mineralization is decreased. Mild right wrist triscaphe and thumb CMC/IP joint degenerative spurring and sclerosis. Similar changes to the PIP and DIP joints of the right hand digits. No radiopaque foreign bodies. Soft tissue swelling and subcutaneous air adjacent to the right thumb metacarpal.     1.  No acute osseous findings about the left nor right hand. Sequela of prior trauma to the distal right radius. Overall alignment is anatomic. 2.  Soft tissue swelling and subcutaneous air adjacent to the right thumb metacarpal. 3.  Bilateral wrist and hand osteoarthritis. Osseous mineralization is decreased. ------------------------- NURSING NOTES AND VITALS REVIEWED ---------------------------  Date / Time Roomed:  5/25/2022  4:35 PM  ED Bed Assignment:  [facility]    The nursing notes within the ED encounter and vital signs as below have been reviewed. BP (!) 154/97   Pulse 74   Temp 98 °F (36.7 °C)   Resp 18   Wt 153 lb (69.4 kg)   SpO2 97%   BMI 28.91 kg/m²   Oxygen Saturation Interpretation: Normal      ------------------------------------------ PROGRESS NOTES ------------------------------------------    I have spoken with the patient and discussed todays availabe results to this point, in addition to providing specific details for the plan of care and counseling regarding the diagnosis and prognosis. Their questions are answered, however they are not agreeable to admission.     --------------------------------- ADDITIONAL PROVIDER NOTES ---------------------------------  This patient has chosen to leave against medical advice. I have personally explained to them that choosing to do so may result in permanent bodily harm or death. I discussed at length that without further evaluation and monitoring there may be unforeseen circumstances and deterioration resulting in permanent bodily harm or death as a result of their choice.   They are alert, oriented, and competent at this time. They state that they are aware of the serious risks as explained, but they continue to wish to leave against medical   advice. In light of their decision to leave against medical advice, follow-up has been arranged and they are aware of the importance of following up as instructed. They have been advised that they should return to the ED immediately if they change their mind at any time, or if their condition begins to change or worsen. The follow up plan has been discussed in detail and they are aware of the specific conditions for emergent return, as well as the importance of follow-up. Discharge Medication List as of 5/25/2022  8:14 PM      START taking these medications    Details   HYDROcodone-acetaminophen (NORCO) 5-325 MG per tablet Take 1 tablet by mouth every 6 hours as needed for Pain for up to 2 days. Intended supply: 3 days. Take lowest dose possible to manage pain, Disp-8 tablet, R-0Print      levoFLOXacin (LEVAQUIN) 750 MG tablet Take 1 tablet by mouth daily for 10 days, Disp-10 tablet, R-0Normal             Diagnosis:  1. Cat bite, initial encounter        Disposition:  Patient's disposition: Left against medical advice. Patient's condition is serious.           Kim Mcallister MD  05/27/22 [id number]

## 2022-05-27 ASSESSMENT — ENCOUNTER SYMPTOMS
VOMITING: 0
ABDOMINAL DISTENTION: 0
DIARRHEA: 0
PHOTOPHOBIA: 0
COUGH: 0
NAUSEA: 0
ABDOMINAL PAIN: 0
CHEST TIGHTNESS: 0
SHORTNESS OF BREATH: 0

## 2022-05-30 LAB
BLOOD CULTURE, ROUTINE: NORMAL
CULTURE, BLOOD 2: NORMAL

## 2022-10-21 ENCOUNTER — TELEPHONE (OUTPATIENT)
Dept: VASCULAR SURGERY | Age: 67
End: 2022-10-21

## 2023-05-11 ENCOUNTER — HOSPITAL ENCOUNTER (INPATIENT)
Age: 68
LOS: 3 days | Discharge: HOME | DRG: 753 | End: 2023-05-15
Attending: EMERGENCY MEDICINE | Admitting: PSYCHIATRY & NEUROLOGY
Payer: MEDICAID

## 2023-05-11 DIAGNOSIS — F14.90 COCAINE USE: ICD-10-CM

## 2023-05-11 DIAGNOSIS — F39 MOOD DISORDER (HCC): Primary | ICD-10-CM

## 2023-05-11 LAB
ALBUMIN SERPL-MCNC: 4.2 G/DL (ref 3.5–5.2)
ALP SERPL-CCNC: 106 U/L (ref 35–104)
ALT SERPL-CCNC: 12 U/L (ref 0–32)
AMPHET UR QL SCN: NOT DETECTED
ANION GAP SERPL CALCULATED.3IONS-SCNC: 9 MMOL/L (ref 7–16)
APAP SERPL-MCNC: <5 MCG/ML (ref 10–30)
AST SERPL-CCNC: 14 U/L (ref 0–31)
BARBITURATES UR QL SCN: NOT DETECTED
BASOPHILS # BLD: 0.07 E9/L (ref 0–0.2)
BASOPHILS NFR BLD: 0.7 % (ref 0–2)
BENZODIAZ UR QL SCN: NOT DETECTED
BILIRUB SERPL-MCNC: 0.2 MG/DL (ref 0–1.2)
BILIRUB UR QL STRIP: NEGATIVE
BUN SERPL-MCNC: 9 MG/DL (ref 6–23)
CALCIUM SERPL-MCNC: 9.4 MG/DL (ref 8.6–10.2)
CANNABINOIDS UR QL SCN: NOT DETECTED
CHLORIDE SERPL-SCNC: 103 MMOL/L (ref 98–107)
CK SERPL-CCNC: 94 U/L (ref 20–180)
CLARITY UR: CLEAR
CO2 SERPL-SCNC: 29 MMOL/L (ref 22–29)
COCAINE UR QL SCN: POSITIVE
COLOR UR: YELLOW
CREAT SERPL-MCNC: 1 MG/DL (ref 0.5–1)
DRUG SCREEN COMMENT UR-IMP: ABNORMAL
EOSINOPHIL # BLD: 0.12 E9/L (ref 0.05–0.5)
EOSINOPHIL NFR BLD: 1.2 % (ref 0–6)
ERYTHROCYTE [DISTWIDTH] IN BLOOD BY AUTOMATED COUNT: 13 FL (ref 11.5–15)
ETHANOLAMINE SERPL-MCNC: <10 MG/DL (ref 0–0.08)
FENTANYL SCREEN, URINE: NOT DETECTED
GLUCOSE SERPL-MCNC: 113 MG/DL (ref 74–99)
GLUCOSE UR STRIP-MCNC: NEGATIVE MG/DL
HCT VFR BLD AUTO: 45.5 % (ref 34–48)
HGB BLD-MCNC: 14.9 G/DL (ref 11.5–15.5)
HGB UR QL STRIP: NEGATIVE
IMM GRANULOCYTES # BLD: 0.05 E9/L
IMM GRANULOCYTES NFR BLD: 0.5 % (ref 0–5)
KETONES UR STRIP-MCNC: NEGATIVE MG/DL
LEUKOCYTE ESTERASE UR QL STRIP: NEGATIVE
LYMPHOCYTES # BLD: 1.5 E9/L (ref 1.5–4)
LYMPHOCYTES NFR BLD: 15 % (ref 20–42)
MCH RBC QN AUTO: 31.5 PG (ref 26–35)
MCHC RBC AUTO-ENTMCNC: 32.7 % (ref 32–34.5)
MCV RBC AUTO: 96.2 FL (ref 80–99.9)
METHADONE UR QL SCN: NOT DETECTED
MONOCYTES # BLD: 0.51 E9/L (ref 0.1–0.95)
MONOCYTES NFR BLD: 5.1 % (ref 2–12)
NEUTROPHILS # BLD: 7.77 E9/L (ref 1.8–7.3)
NEUTS SEG NFR BLD: 77.5 % (ref 43–80)
NITRITE UR QL STRIP: NEGATIVE
OPIATES UR QL SCN: NOT DETECTED
OXYCODONE URINE: NOT DETECTED
PCP UR QL SCN: NOT DETECTED
PH UR STRIP: 6 [PH] (ref 5–9)
PLATELET # BLD AUTO: 266 E9/L (ref 130–450)
PMV BLD AUTO: 11.3 FL (ref 7–12)
POTASSIUM SERPL-SCNC: 4.9 MMOL/L (ref 3.5–5)
PROT SERPL-MCNC: 7.2 G/DL (ref 6.4–8.3)
PROT UR STRIP-MCNC: NEGATIVE MG/DL
RBC # BLD AUTO: 4.73 E12/L (ref 3.5–5.5)
REASON FOR REJECTION: NORMAL
REJECTED TEST: NORMAL
SALICYLATES SERPL-MCNC: <0.3 MG/DL (ref 0–30)
SODIUM SERPL-SCNC: 141 MMOL/L (ref 132–146)
SP GR UR STRIP: 1.01 (ref 1–1.03)
TRICYCLIC ANTIDEPRESSANTS SCREEN SERUM: NEGATIVE NG/ML
UROBILINOGEN UR STRIP-ACNC: 0.2 E.U./DL
WBC # BLD: 10 E9/L (ref 4.5–11.5)

## 2023-05-11 PROCEDURE — 82077 ASSAY SPEC XCP UR&BREATH IA: CPT

## 2023-05-11 PROCEDURE — 36415 COLL VENOUS BLD VENIPUNCTURE: CPT

## 2023-05-11 PROCEDURE — 99285 EMERGENCY DEPT VISIT HI MDM: CPT

## 2023-05-11 PROCEDURE — 80143 DRUG ASSAY ACETAMINOPHEN: CPT

## 2023-05-11 PROCEDURE — 80179 DRUG ASSAY SALICYLATE: CPT

## 2023-05-11 PROCEDURE — 85025 COMPLETE CBC W/AUTO DIFF WBC: CPT

## 2023-05-11 PROCEDURE — 93005 ELECTROCARDIOGRAM TRACING: CPT | Performed by: EMERGENCY MEDICINE

## 2023-05-11 PROCEDURE — 82550 ASSAY OF CK (CPK): CPT

## 2023-05-11 PROCEDURE — 80307 DRUG TEST PRSMV CHEM ANLYZR: CPT

## 2023-05-11 PROCEDURE — 81003 URINALYSIS AUTO W/O SCOPE: CPT

## 2023-05-11 PROCEDURE — 80053 COMPREHEN METABOLIC PANEL: CPT

## 2023-05-11 ASSESSMENT — LIFESTYLE VARIABLES: HOW OFTEN DO YOU HAVE A DRINK CONTAINING ALCOHOL: NEVER

## 2023-05-11 ASSESSMENT — PAIN - FUNCTIONAL ASSESSMENT: PAIN_FUNCTIONAL_ASSESSMENT: NONE - DENIES PAIN

## 2023-05-12 PROBLEM — F09 COGNITIVE DISORDER: Status: ACTIVE | Noted: 2023-05-12

## 2023-05-12 PROBLEM — F32.9 MAJOR DEPRESSION, SINGLE EPISODE: Status: ACTIVE | Noted: 2023-05-12

## 2023-05-12 PROBLEM — F31.60 BIPOLAR AFFECTIVE DISORDER, CURRENT EPISODE MIXED (HCC): Status: ACTIVE | Noted: 2023-05-12

## 2023-05-12 PROBLEM — F32.9 MAJOR DEPRESSION, SINGLE EPISODE: Status: RESOLVED | Noted: 2023-05-12 | Resolved: 2023-05-12

## 2023-05-12 LAB
EKG ATRIAL RATE: 65 BPM
EKG P AXIS: 55 DEGREES
EKG P-R INTERVAL: 166 MS
EKG Q-T INTERVAL: 438 MS
EKG QRS DURATION: 88 MS
EKG QTC CALCULATION (BAZETT): 455 MS
EKG R AXIS: 31 DEGREES
EKG T AXIS: 16 DEGREES
EKG VENTRICULAR RATE: 65 BPM

## 2023-05-12 PROCEDURE — 93010 ELECTROCARDIOGRAM REPORT: CPT | Performed by: INTERNAL MEDICINE

## 2023-05-12 PROCEDURE — 6370000000 HC RX 637 (ALT 250 FOR IP): Performed by: PSYCHIATRY & NEUROLOGY

## 2023-05-12 PROCEDURE — 6370000000 HC RX 637 (ALT 250 FOR IP): Performed by: STUDENT IN AN ORGANIZED HEALTH CARE EDUCATION/TRAINING PROGRAM

## 2023-05-12 PROCEDURE — 94664 DEMO&/EVAL PT USE INHALER: CPT

## 2023-05-12 PROCEDURE — 1240000000 HC EMOTIONAL WELLNESS R&B

## 2023-05-12 PROCEDURE — 6370000000 HC RX 637 (ALT 250 FOR IP): Performed by: NURSE PRACTITIONER

## 2023-05-12 RX ORDER — DIVALPROEX SODIUM 250 MG/1
250 TABLET, DELAYED RELEASE ORAL EVERY 12 HOURS SCHEDULED
Status: DISCONTINUED | OUTPATIENT
Start: 2023-05-12 | End: 2023-05-15 | Stop reason: HOSPADM

## 2023-05-12 RX ORDER — HYDROXYZINE PAMOATE 25 MG/1
50 CAPSULE ORAL 3 TIMES DAILY PRN
Status: DISCONTINUED | OUTPATIENT
Start: 2023-05-12 | End: 2023-05-15 | Stop reason: HOSPADM

## 2023-05-12 RX ORDER — PANTOPRAZOLE SODIUM 40 MG/1
40 TABLET, DELAYED RELEASE ORAL
Status: DISCONTINUED | OUTPATIENT
Start: 2023-05-13 | End: 2023-05-15 | Stop reason: HOSPADM

## 2023-05-12 RX ORDER — LEVOTHYROXINE SODIUM 0.05 MG/1
200 TABLET ORAL DAILY
Status: DISCONTINUED | OUTPATIENT
Start: 2023-05-12 | End: 2023-05-15 | Stop reason: HOSPADM

## 2023-05-12 RX ORDER — EZETIMIBE 10 MG/1
10 TABLET ORAL DAILY
Status: DISCONTINUED | OUTPATIENT
Start: 2023-05-12 | End: 2023-05-15 | Stop reason: HOSPADM

## 2023-05-12 RX ORDER — ACETAMINOPHEN 325 MG/1
650 TABLET ORAL EVERY 4 HOURS PRN
Status: DISCONTINUED | OUTPATIENT
Start: 2023-05-12 | End: 2023-05-15 | Stop reason: HOSPADM

## 2023-05-12 RX ORDER — NICOTINE 21 MG/24HR
1 PATCH, TRANSDERMAL 24 HOURS TRANSDERMAL DAILY
Status: DISCONTINUED | OUTPATIENT
Start: 2023-05-12 | End: 2023-05-15 | Stop reason: HOSPADM

## 2023-05-12 RX ORDER — LANOLIN ALCOHOL/MO/W.PET/CERES
3 CREAM (GRAM) TOPICAL NIGHTLY
Status: DISCONTINUED | OUTPATIENT
Start: 2023-05-12 | End: 2023-05-12

## 2023-05-12 RX ORDER — TETRAHYDROZOLINE HCL 0.05 %
1 DROPS OPHTHALMIC (EYE) 2 TIMES DAILY
Status: DISCONTINUED | OUTPATIENT
Start: 2023-05-12 | End: 2023-05-12

## 2023-05-12 RX ORDER — IPRATROPIUM BROMIDE AND ALBUTEROL SULFATE 2.5; .5 MG/3ML; MG/3ML
3 SOLUTION RESPIRATORY (INHALATION) 4 TIMES DAILY
Status: DISCONTINUED | OUTPATIENT
Start: 2023-05-12 | End: 2023-05-14

## 2023-05-12 RX ORDER — OXYBUTYNIN CHLORIDE 5 MG/1
5 TABLET ORAL NIGHTLY
Status: DISCONTINUED | OUTPATIENT
Start: 2023-05-12 | End: 2023-05-15 | Stop reason: HOSPADM

## 2023-05-12 RX ORDER — NITROGLYCERIN 0.4 MG/1
0.4 TABLET SUBLINGUAL EVERY 5 MIN PRN
Status: DISCONTINUED | OUTPATIENT
Start: 2023-05-12 | End: 2023-05-15 | Stop reason: HOSPADM

## 2023-05-12 RX ORDER — CLOPIDOGREL BISULFATE 75 MG/1
75 TABLET ORAL DAILY
Status: DISCONTINUED | OUTPATIENT
Start: 2023-05-12 | End: 2023-05-15 | Stop reason: HOSPADM

## 2023-05-12 RX ORDER — AMLODIPINE BESYLATE 5 MG/1
5 TABLET ORAL DAILY
Status: DISCONTINUED | OUTPATIENT
Start: 2023-05-12 | End: 2023-05-14

## 2023-05-12 RX ORDER — HALOPERIDOL 5 MG/ML
3 INJECTION INTRAMUSCULAR EVERY 6 HOURS PRN
Status: DISCONTINUED | OUTPATIENT
Start: 2023-05-12 | End: 2023-05-15 | Stop reason: HOSPADM

## 2023-05-12 RX ORDER — RALOXIFENE HYDROCHLORIDE 60 MG/1
60 TABLET, FILM COATED ORAL DAILY
Status: DISCONTINUED | OUTPATIENT
Start: 2023-05-12 | End: 2023-05-14

## 2023-05-12 RX ORDER — MAGNESIUM HYDROXIDE/ALUMINUM HYDROXICE/SIMETHICONE 120; 1200; 1200 MG/30ML; MG/30ML; MG/30ML
30 SUSPENSION ORAL PRN
Status: DISCONTINUED | OUTPATIENT
Start: 2023-05-12 | End: 2023-05-15 | Stop reason: HOSPADM

## 2023-05-12 RX ORDER — ENALAPRIL MALEATE 5 MG/1
5 TABLET ORAL DAILY
Status: DISCONTINUED | OUTPATIENT
Start: 2023-05-12 | End: 2023-05-15 | Stop reason: HOSPADM

## 2023-05-12 RX ORDER — ASPIRIN 81 MG/1
81 TABLET ORAL DAILY
Status: DISCONTINUED | OUTPATIENT
Start: 2023-05-12 | End: 2023-05-15 | Stop reason: HOSPADM

## 2023-05-12 RX ORDER — HALOPERIDOL 2 MG/1
3 TABLET ORAL EVERY 6 HOURS PRN
Status: DISCONTINUED | OUTPATIENT
Start: 2023-05-12 | End: 2023-05-15 | Stop reason: HOSPADM

## 2023-05-12 RX ORDER — METOPROLOL SUCCINATE 25 MG/1
25 TABLET, EXTENDED RELEASE ORAL DAILY
Status: DISCONTINUED | OUTPATIENT
Start: 2023-05-12 | End: 2023-05-15 | Stop reason: HOSPADM

## 2023-05-12 RX ORDER — LANOLIN ALCOHOL/MO/W.PET/CERES
3 CREAM (GRAM) TOPICAL DAILY
Status: DISCONTINUED | OUTPATIENT
Start: 2023-05-12 | End: 2023-05-15 | Stop reason: HOSPADM

## 2023-05-12 RX ORDER — ATORVASTATIN CALCIUM 40 MG/1
80 TABLET, FILM COATED ORAL DAILY
Status: DISCONTINUED | OUTPATIENT
Start: 2023-05-12 | End: 2023-05-15 | Stop reason: HOSPADM

## 2023-05-12 RX ADMIN — MELATONIN 3 MG ORAL TABLET 3 MG: 3 TABLET ORAL at 18:32

## 2023-05-12 RX ADMIN — CLOPIDOGREL BISULFATE 75 MG: 75 TABLET ORAL at 13:08

## 2023-05-12 RX ADMIN — ASPIRIN 81 MG: 81 TABLET, COATED ORAL at 13:06

## 2023-05-12 RX ADMIN — OXYBUTYNIN CHLORIDE 5 MG: 5 TABLET ORAL at 21:57

## 2023-05-12 RX ADMIN — LEVOTHYROXINE SODIUM 200 MCG: 0.05 TABLET ORAL at 13:05

## 2023-05-12 RX ADMIN — DIVALPROEX SODIUM 250 MG: 250 TABLET, DELAYED RELEASE ORAL at 13:05

## 2023-05-12 RX ADMIN — DIVALPROEX SODIUM 250 MG: 250 TABLET, DELAYED RELEASE ORAL at 21:57

## 2023-05-12 RX ADMIN — IPRATROPIUM BROMIDE AND ALBUTEROL SULFATE 3 ML: 2.5; .5 SOLUTION RESPIRATORY (INHALATION) at 13:55

## 2023-05-12 ASSESSMENT — SLEEP AND FATIGUE QUESTIONNAIRES
AVERAGE NUMBER OF SLEEP HOURS: 7
SLEEP PATTERN: DIFFICULTY FALLING ASLEEP;DISTURBED/INTERRUPTED SLEEP;INSOMNIA
DO YOU USE A SLEEP AID: NO
DO YOU HAVE DIFFICULTY SLEEPING: YES
AVERAGE NUMBER OF SLEEP HOURS: 7
DO YOU HAVE DIFFICULTY SLEEPING: NO
DO YOU USE A SLEEP AID: YES

## 2023-05-12 ASSESSMENT — LIFESTYLE VARIABLES
HOW MANY STANDARD DRINKS CONTAINING ALCOHOL DO YOU HAVE ON A TYPICAL DAY: PATIENT DOES NOT DRINK
HOW OFTEN DO YOU HAVE A DRINK CONTAINING ALCOHOL: NEVER
HOW MANY STANDARD DRINKS CONTAINING ALCOHOL DO YOU HAVE ON A TYPICAL DAY: PATIENT DOES NOT DRINK
HOW OFTEN DO YOU HAVE A DRINK CONTAINING ALCOHOL: NEVER

## 2023-05-13 LAB
CHOLESTEROL, TOTAL: 240 MG/DL (ref 0–199)
HBA1C MFR BLD: 6 % (ref 4–5.6)
HDLC SERPL-MCNC: 50 MG/DL
LDLC SERPL CALC-MCNC: 169 MG/DL (ref 0–99)
TRIGL SERPL-MCNC: 105 MG/DL (ref 0–149)
TSH SERPL-MCNC: 2.02 UIU/ML (ref 0.27–4.2)
VLDLC SERPL CALC-MCNC: 21 MG/DL

## 2023-05-13 PROCEDURE — 80061 LIPID PANEL: CPT

## 2023-05-13 PROCEDURE — 36415 COLL VENOUS BLD VENIPUNCTURE: CPT

## 2023-05-13 PROCEDURE — 94640 AIRWAY INHALATION TREATMENT: CPT

## 2023-05-13 PROCEDURE — 1240000000 HC EMOTIONAL WELLNESS R&B

## 2023-05-13 PROCEDURE — 6370000000 HC RX 637 (ALT 250 FOR IP): Performed by: STUDENT IN AN ORGANIZED HEALTH CARE EDUCATION/TRAINING PROGRAM

## 2023-05-13 PROCEDURE — 84443 ASSAY THYROID STIM HORMONE: CPT

## 2023-05-13 PROCEDURE — 6370000000 HC RX 637 (ALT 250 FOR IP): Performed by: NURSE PRACTITIONER

## 2023-05-13 PROCEDURE — 83036 HEMOGLOBIN GLYCOSYLATED A1C: CPT

## 2023-05-13 RX ADMIN — PANTOPRAZOLE SODIUM 40 MG: 40 TABLET, DELAYED RELEASE ORAL at 06:15

## 2023-05-13 RX ADMIN — LEVOTHYROXINE SODIUM 200 MCG: 0.05 TABLET ORAL at 06:15

## 2023-05-13 RX ADMIN — ENALAPRIL MALEATE 5 MG: 5 TABLET ORAL at 08:59

## 2023-05-13 RX ADMIN — CLOPIDOGREL BISULFATE 75 MG: 75 TABLET ORAL at 08:59

## 2023-05-13 RX ADMIN — IPRATROPIUM BROMIDE AND ALBUTEROL SULFATE 3 ML: 2.5; .5 SOLUTION RESPIRATORY (INHALATION) at 13:57

## 2023-05-13 RX ADMIN — ATORVASTATIN CALCIUM 80 MG: 40 TABLET, FILM COATED ORAL at 08:58

## 2023-05-13 RX ADMIN — MELATONIN 3 MG ORAL TABLET 3 MG: 3 TABLET ORAL at 17:42

## 2023-05-13 RX ADMIN — DIVALPROEX SODIUM 250 MG: 250 TABLET, DELAYED RELEASE ORAL at 08:58

## 2023-05-13 RX ADMIN — DIVALPROEX SODIUM 250 MG: 250 TABLET, DELAYED RELEASE ORAL at 20:29

## 2023-05-13 RX ADMIN — METOPROLOL SUCCINATE 25 MG: 25 TABLET, EXTENDED RELEASE ORAL at 08:58

## 2023-05-13 RX ADMIN — ASPIRIN 81 MG: 81 TABLET, COATED ORAL at 08:57

## 2023-05-13 RX ADMIN — EZETIMIBE 10 MG: 10 TABLET ORAL at 08:59

## 2023-05-13 RX ADMIN — AMLODIPINE BESYLATE 5 MG: 5 TABLET ORAL at 08:57

## 2023-05-13 RX ADMIN — OXYBUTYNIN CHLORIDE 5 MG: 5 TABLET ORAL at 20:29

## 2023-05-14 PROCEDURE — 6370000000 HC RX 637 (ALT 250 FOR IP): Performed by: NURSE PRACTITIONER

## 2023-05-14 PROCEDURE — 6370000000 HC RX 637 (ALT 250 FOR IP): Performed by: STUDENT IN AN ORGANIZED HEALTH CARE EDUCATION/TRAINING PROGRAM

## 2023-05-14 PROCEDURE — 1240000000 HC EMOTIONAL WELLNESS R&B

## 2023-05-14 RX ORDER — IPRATROPIUM BROMIDE AND ALBUTEROL SULFATE 2.5; .5 MG/3ML; MG/3ML
3 SOLUTION RESPIRATORY (INHALATION) EVERY 4 HOURS PRN
Status: DISCONTINUED | OUTPATIENT
Start: 2023-05-14 | End: 2023-05-15 | Stop reason: HOSPADM

## 2023-05-14 RX ADMIN — ASPIRIN 81 MG: 81 TABLET, COATED ORAL at 09:00

## 2023-05-14 RX ADMIN — OXYBUTYNIN CHLORIDE 5 MG: 5 TABLET ORAL at 20:31

## 2023-05-14 RX ADMIN — PANTOPRAZOLE SODIUM 40 MG: 40 TABLET, DELAYED RELEASE ORAL at 06:20

## 2023-05-14 RX ADMIN — ATORVASTATIN CALCIUM 80 MG: 40 TABLET, FILM COATED ORAL at 09:01

## 2023-05-14 RX ADMIN — CLOPIDOGREL BISULFATE 75 MG: 75 TABLET ORAL at 09:04

## 2023-05-14 RX ADMIN — EZETIMIBE 10 MG: 10 TABLET ORAL at 09:03

## 2023-05-14 RX ADMIN — DIVALPROEX SODIUM 250 MG: 250 TABLET, DELAYED RELEASE ORAL at 20:31

## 2023-05-14 RX ADMIN — ENALAPRIL MALEATE 5 MG: 5 TABLET ORAL at 09:01

## 2023-05-14 RX ADMIN — LEVOTHYROXINE SODIUM 200 MCG: 0.05 TABLET ORAL at 06:20

## 2023-05-14 RX ADMIN — MELATONIN 3 MG ORAL TABLET 3 MG: 3 TABLET ORAL at 17:43

## 2023-05-14 RX ADMIN — DIVALPROEX SODIUM 250 MG: 250 TABLET, DELAYED RELEASE ORAL at 09:00

## 2023-05-14 RX ADMIN — METOPROLOL SUCCINATE 25 MG: 25 TABLET, EXTENDED RELEASE ORAL at 09:03

## 2023-05-15 VITALS
SYSTOLIC BLOOD PRESSURE: 107 MMHG | HEART RATE: 61 BPM | HEIGHT: 61 IN | RESPIRATION RATE: 16 BRPM | WEIGHT: 140 LBS | TEMPERATURE: 97.5 F | OXYGEN SATURATION: 97 % | DIASTOLIC BLOOD PRESSURE: 63 MMHG | BODY MASS INDEX: 26.43 KG/M2

## 2023-05-15 PROCEDURE — 6370000000 HC RX 637 (ALT 250 FOR IP): Performed by: NURSE PRACTITIONER

## 2023-05-15 PROCEDURE — 6370000000 HC RX 637 (ALT 250 FOR IP): Performed by: STUDENT IN AN ORGANIZED HEALTH CARE EDUCATION/TRAINING PROGRAM

## 2023-05-15 RX ORDER — LANOLIN ALCOHOL/MO/W.PET/CERES
3 CREAM (GRAM) TOPICAL DAILY
Refills: 0 | COMMUNITY
Start: 2023-05-15

## 2023-05-15 RX ORDER — DIVALPROEX SODIUM 250 MG/1
250 TABLET, DELAYED RELEASE ORAL EVERY 12 HOURS SCHEDULED
Qty: 60 TABLET | Refills: 0 | Status: SHIPPED | OUTPATIENT
Start: 2023-05-15 | End: 2023-06-14

## 2023-05-15 RX ADMIN — ASPIRIN 81 MG: 81 TABLET, COATED ORAL at 09:09

## 2023-05-15 RX ADMIN — LEVOTHYROXINE SODIUM 200 MCG: 0.05 TABLET ORAL at 06:40

## 2023-05-15 RX ADMIN — EZETIMIBE 10 MG: 10 TABLET ORAL at 09:09

## 2023-05-15 RX ADMIN — METOPROLOL SUCCINATE 25 MG: 25 TABLET, EXTENDED RELEASE ORAL at 09:09

## 2023-05-15 RX ADMIN — DIVALPROEX SODIUM 250 MG: 250 TABLET, DELAYED RELEASE ORAL at 09:09

## 2023-05-15 RX ADMIN — CLOPIDOGREL BISULFATE 75 MG: 75 TABLET ORAL at 09:09

## 2023-05-15 RX ADMIN — PANTOPRAZOLE SODIUM 40 MG: 40 TABLET, DELAYED RELEASE ORAL at 06:40

## 2023-05-15 RX ADMIN — ENALAPRIL MALEATE 5 MG: 5 TABLET ORAL at 09:09

## 2023-05-15 RX ADMIN — ATORVASTATIN CALCIUM 80 MG: 40 TABLET, FILM COATED ORAL at 09:09

## 2023-05-15 ASSESSMENT — PAIN SCALES - GENERAL
PAINLEVEL_OUTOF10: 0
PAINLEVEL_OUTOF10: 0

## 2023-05-15 NOTE — CARE COORDINATION
LPC met with pt regarding discharge. Pt reported that she feels good and is ready to go home. She denies SI/HI/AVH When asked where she is going to live, she reported that she is going to stay with her neighbor. LPC asked pt if she wanted to sign an MICHAEL for neighbor so we can come confirm this and pt agreed. Pt was cooperative, good eye contact, pt insight/judgement does not seem to have improved. She continues to be confused about what happened to her animals upon being admitted to the hospital.     [facility] contacted Cailin Leoncio hawley (). [facility] reported that he knows pt is in the hospital because she got upset about her animals being taken away. He reported that [employer] came to take them but he does not know anymore than that. LPC explained that pt wants to live with them upon d/c since she is unable to return home, neighbor agreed and provided his address. He reported that he does not have any concerns about her returning home to live with them and did speak with her briefly today. [facility] reported that he does not have any guns or weapons in his home. He reported that one of their friends would be able to pick pt up if needed.      In order to ensure appropriate transition and discharge planning is in place, the following documents have been transmitted to [facility] as the new outpatient provider:    The d/c diagnosis was transmitted to the next care provider  The reason for hospitalization was transmitted to the next care provider  The d/c medications (dosage and indication) were transmitted to the next care provider   The continuing care plan was transmitted to the next care provider

## 2023-05-15 NOTE — GROUP NOTE
Group Therapy Note    Date: 5/15/2023    Group Start Time: 1115  Group End Time: 1150  Group Topic: Psychoeducation    [facility]  [id number], [address]                                                                        Group Therapy Note    Date: 5/15/2023  Start Time: 1115  End Time:  1150  Number of Participants: 9    Type of Group: Psychoeducation    Wellness Binder Information  Module Name:  protective factors   Patient's Goal:  Patient will be able to id what his/her protective factors are and what keeps him/her mentally well. Notes:  Pleasant and sharing in group, willing to accept handout. Status After Intervention:  Improved    Participation Level:  Active Listener and Interactive    Participation Quality: Appropriate, Attentive, and Sharing      Speech:  normal      Thought Process/Content: Logical      Affective Functioning: Congruent      Mood: euthymic      Level of consciousness:  Alert, Oriented x4, and Attentive      Response to Learning: Able to verbalize/acknowledge new learning, Able to retain information, and Progressing to goal      Endings: None Reported    Modes of Intervention: Education, Support, Socialization, and Problem-solving      Discipline Responsible: Psychoeducational Specialist      Signature:  Margot Monte

## 2023-05-15 NOTE — DISCHARGE INSTRUCTIONS
Follow up for Tobacco Cessation at:    [facility] Tobacco [facility]                                 Date:  Friday May19th at [address]  [place],  [address]   ([address]    take B elevators to 7th floor)   Phone: [phone]   Fax: [phone]

## 2023-05-15 NOTE — CARE COORDINATION
LPC met with pt. Pt reported that she feels well today and that she would like to go home. She reported that she has a lot of things to do and can't do any of them while she is in here. She denied any SI/HI/AVH. LPC discussed outpatient providers, and pt reported that she would like to attend [facility] for outpatient counseling services.      Electronically signed by Thu Arthur LPC on 5/15/2023 at 10:12 AM

## 2023-05-15 NOTE — PROGRESS NOTES
Patient attended morning community meeting. Updated on staffing assignments and daily expectations. Shared goal for the day as to go home and do what I am supposed to.

## 2023-05-15 NOTE — PROGRESS NOTES
[facility]  Discharge Note    Pt discharged with followings belongings:   Dental Appliances: None  Vision - Corrective Lenses: Eyeglasses  Hearing Aid: None  Jewelry: None  Body Piercings Removed: N/A  Clothing: Footwear, Pants, Shirt, Jacket/Coat (blk shoes, blk joggers, purp shirt, grey hoodie)  Other Valuables: Cigarettes, Lighter/Matches   Valuables sent home with patient. Patient educated on aftercare instructions: yes  . Patient verbalize understanding of AVS:  yes. Status EXAM upon discharge:  Mental Status and Behavioral Exam  Normal: No  Level of Assistance: Independent/Self  Facial Expression: Flat, Sad  Affect: Congruent  Level of Consciousness: Alert  Frequency of Checks: 4 times per hour, close  Mood:Normal: No  Mood: Depressed, Anxious  Motor Activity:Normal: No  Motor Activity: Decreased  Eye Contact: Fair  Observed Behavior: Cooperative, Preoccupied  Sexual Misconduct History: Current - no  Preception: Gardners to person, Gardners to time, Gard[place] to place, [place] to situation  Attention:Normal: No  Attention: Distractible  Thought Processes: Circumstantial  Thought Content:Normal: No  Thought Content: Preoccupations  Depression Symptoms: Impaired concentration, Isolative  Anxiety Symptoms: Generalized  Sana Symptoms: No problems reported or observed.   Hallucinations: None  Delusions: No  Memory:Normal: No  Memory: Poor recent, Poor remote  Insight and Judgment: No  Insight and Judgment: Poor judgment, Poor insight    Tobacco Screening:  Practical Counseling, on admission, fahad X, if applicable and completed (first 3 are required if patient doesn't refuse):            ( X ) Recognizing danger situations (included triggers and roadblocks)                    ( X ) Coping skills (new ways to manage stress,relaxation techniques, changing routine, distraction)                                                           ( X ) Basic information about quitting (benefits of quitting, techniques

## 2023-05-15 NOTE — PROGRESS NOTES
CLINICAL PHARMACY NOTE: MEDS TO BEDS    Total # of Prescriptions Filled: 1   The following medications were delivered to the patient:  Divalproex 250 mg    Additional Documentation:

## 2023-05-15 NOTE — DISCHARGE SUMMARY
DISCHARGE SUMMARY      Patient ID:  Jose Baugh  [id number]  79 y.o.  1955    Admit date: 5/11/2023    Discharge date and time: 5/15/2023    Admitting Physician: Grace Prince MD     Discharge Physician: Dr Alondra An MD    Discharge Diagnoses:   Patient Active Problem List   Diagnosis    Chest pain    Palpitations    CAD (coronary artery disease)    History of atrial flutter    S/P ablation of atrial flutter    Tobacco abuse    Hx-TIA (transient ischemic attack)    Dyspnea    Dyslipidemia    Sinus bradycardia    COPD (chronic obstructive pulmonary disease) ([facility])    Primary osteoarthritis of right knee    Primary osteoarthritis of left knee    Essential hypertension    Lumbar radiculopathy    PVD (peripheral vascular disease) ([facility])    Cognitive disorder    Bipolar affective disorder, current episode mixed ([facility]       Admission Condition: poor    Discharged Condition: stable    Admission Circumstance:   Patient was presented to [facility] emergency department via EMS accompanied by police after threatening behavior throwing a hammer in the presence of police and threatening suicide. She was pink slipped for suicidal statements and agitated behavior.       PAST MEDICAL/PSYCHIATRIC HISTORY:   Past Medical History:   Diagnosis Date    ADHD (attention deficit hyperactivity disorder)     Allergic rhinitis     Anxiety     Asthma     Atrial fibrillation ([facility])     Bipolar affective disorder, current episode mixed ([address] 5/12/2023    CAD (coronary artery disease)     Carpal tunnel syndrome     Chronic back pain     COPD (chronic obstructive pulmonary disease) ([facility])     Depression     Diabetes mellitus ([facility])     borderline, diet controlled    Diverticul disease small and large intestine, no perforati or abscess     Emphysema of lung ([address]     Fibromyalgia     Gall stones 1976    Gall stones     GERD (gastroesophageal reflux disease)     Grave's disease     10 year history    Headache(784.0)     Hiatal hernia

## 2023-05-15 NOTE — PLAN OF CARE
Patient denies SI/HI and hallucinations. Patient is cooperative but does display some underlying irritability. She is preoccupied with her animals and frequently calls animal charities to check on her pets but is unable to reach anyone. It was reported to staff on Friday that patient stated her animals were all put to sleep. She is discharged focused and demanding to leave. She is out on the unit but isolative to self. She is taking prescribed medications without issue. Will continue to offer support and comfort to patient. Problem: Risk for Elopement  Goal: Patient will not exit the unit/facility without proper excort  Outcome: Progressing     Problem: Anxiety  Goal: Will report anxiety at manageable levels  Description: INTERVENTIONS:  1. Administer medication as ordered  2. Teach and rehearse alternative coping skills  3. Provide emotional support with 1:1 interaction with staff  5/15/2023 0943 by Felipe Phillips RN  Outcome: Progressing     Problem: Coping  Goal: Pt/Family able to verbalize concerns and demonstrate effective coping strategies  Description: INTERVENTIONS:  1. Assist patient/family to identify coping skills, available support systems and cultural and spiritual values  2. Provide emotional support, including active listening and acknowledgement of concerns of patient and caregivers  3. Reduce environmental stimuli, as able  4. Instruct patient/family in relaxation techniques, as appropriate  5. Assess for spiritual pain/suffering and initiate Spiritual Care, Psychosocial Clinical Specialist consults as needed  Outcome: Progressing     Problem: Depression/Self Harm  Goal: Effect of psychiatric condition will be minimized and patient will be protected from self harm  Description: INTERVENTIONS:  1. Assess impact of patient's symptoms on level of functioning, self care needs and offer support as indicated  2.  Assess patient/family knowledge of depression, impact on illness and need for

## 2023-05-15 NOTE — PLAN OF CARE
Problem: Anxiety  Goal: Will report anxiety at manageable levels  Description: INTERVENTIONS:  1. Administer medication as ordered  2. Teach and rehearse alternative coping skills  3. Provide emotional support with 1:1 interaction with staff  Outcome: Progressing     Problem: Depression/Self Harm  Goal: Effect of psychiatric condition will be minimized and patient will be protected from self harm  Description: INTERVENTIONS:  1. Assess impact of patient's symptoms on level of functioning, self care needs and offer support as indicated  2. Assess patient/family knowledge of depression, impact on illness and need for teaching  3. Provide emotional support, presence and reassurance  4. Assess for possible suicidal thoughts or ideation. If patient expresses suicidal thoughts or statements do not leave alone, initiate Suicide Precautions, move to a room close to the nursing station and obtain sitter  5. Initiate consults as appropriate with Mental Health Professional, Spiritual Care, Psychosocial CNS, and consider a recommendation to the LIP for a Psychiatric Consultation  Outcome: Progressing     Patient has been out on the unit watching tv. Keeps to self. Calm and cooperative during assessment. Voicing anxiety regarding her animals and is focused on discharge and \"getting home\". Denies suicidal/homicidal ideations and hallucinations at this time. Took HS medications without difficulty, but states that the depakote is causing her to feel \"restless\" at night. Purposeful rounding continued.

## 2023-05-16 NOTE — CARE COORDINATION
SW contacted pt for post follow up post discharge phone call. Pt was reminded of their upcoming appointment. Pt did not have any further questions or concerns at this time.

## 2023-08-28 ENCOUNTER — OFFICE VISIT (OUTPATIENT)
Dept: VASCULAR SURGERY | Age: 68
End: 2023-08-28

## 2023-08-28 ENCOUNTER — TELEPHONE (OUTPATIENT)
Dept: VASCULAR SURGERY | Age: 68
End: 2023-08-28

## 2023-08-28 DIAGNOSIS — I73.9 PVD (PERIPHERAL VASCULAR DISEASE) (HCC): Primary | ICD-10-CM
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
## 2023-08-28 NOTE — TELEPHONE ENCOUNTER
Scheduled le arterial Doppler study at [facility] ([facility]) 9/14/23 at 1:00 pm.  Pt notified to report to [facility] ([facility]) 1st floor registration at 12:30 pm, call for results.

## 2023-09-14 ENCOUNTER — HOSPITAL ENCOUNTER (OUTPATIENT)
Dept: INTERVENTIONAL RADIOLOGY/VASCULAR | Age: 68
Discharge: HOME | End: 2023-09-16
Attending: SURGERY
Payer: MEDICAID

## 2023-09-14 DIAGNOSIS — I73.9 PVD (PERIPHERAL VASCULAR DISEASE) (HCC): ICD-10-CM

## 2023-09-14 PROCEDURE — 93923 UPR/LXTR ART STDY 3+ LVLS: CPT

## 2024-06-14 ENCOUNTER — HOSPITAL ENCOUNTER (EMERGENCY)
Age: 69
Discharge: HOME | End: 2024-06-14
Attending: EMERGENCY MEDICINE
Payer: MEDICAID

## 2024-06-14 ENCOUNTER — APPOINTMENT (OUTPATIENT)
Dept: CT IMAGING | Age: 69
End: 2024-06-14
Payer: MEDICAID

## 2024-06-14 VITALS
HEART RATE: 61 BPM | WEIGHT: 165 LBS | RESPIRATION RATE: 17 BRPM | TEMPERATURE: 97.9 F | HEIGHT: 61 IN | DIASTOLIC BLOOD PRESSURE: 86 MMHG | OXYGEN SATURATION: 95 % | SYSTOLIC BLOOD PRESSURE: 168 MMHG | BODY MASS INDEX: 31.15 KG/M2

## 2024-06-14 DIAGNOSIS — R10.32 LEFT LOWER QUADRANT ABDOMINAL PAIN: ICD-10-CM

## 2024-06-14 DIAGNOSIS — K62.5 RECTAL BLEEDING: ICD-10-CM

## 2024-06-14 DIAGNOSIS — K57.90 DIVERTICULOSIS: Primary | ICD-10-CM

## 2024-06-14 LAB
ALBUMIN SERPL-MCNC: 4.2 G/DL (ref 3.5–5.2)
ALP SERPL-CCNC: 109 U/L (ref 35–104)
ALT SERPL-CCNC: 9 U/L (ref 0–32)
ANION GAP SERPL CALCULATED.3IONS-SCNC: 11 MMOL/L (ref 7–16)
AST SERPL-CCNC: 15 U/L (ref 0–31)
BACTERIA URNS QL MICRO: ABNORMAL
BASOPHILS # BLD: 0.07 K/UL (ref 0–0.2)
BASOPHILS NFR BLD: 1 % (ref 0–2)
BILIRUB SERPL-MCNC: 0.4 MG/DL (ref 0–1.2)
BILIRUB UR QL STRIP: NEGATIVE
BUN SERPL-MCNC: 11 MG/DL (ref 6–23)
CALCIUM SERPL-MCNC: 9.1 MG/DL (ref 8.6–10.2)
CHLORIDE SERPL-SCNC: 101 MMOL/L (ref 98–107)
CLARITY UR: CLEAR
CO2 SERPL-SCNC: 25 MMOL/L (ref 22–29)
COLOR UR: YELLOW
CREAT SERPL-MCNC: 1.1 MG/DL (ref 0.5–1)
EOSINOPHIL # BLD: 0.19 K/UL (ref 0.05–0.5)
EOSINOPHILS RELATIVE PERCENT: 2 % (ref 0–6)
ERYTHROCYTE [DISTWIDTH] IN BLOOD BY AUTOMATED COUNT: 13.2 % (ref 11.5–15)
GFR, ESTIMATED: 57 ML/MIN/1.73M2
GLUCOSE SERPL-MCNC: 96 MG/DL (ref 74–99)
GLUCOSE UR STRIP-MCNC: NEGATIVE MG/DL
HCT VFR BLD AUTO: 42.9 % (ref 34–48)
HGB BLD-MCNC: 13.7 G/DL (ref 11.5–15.5)
HGB UR QL STRIP.AUTO: NEGATIVE
IMM GRANULOCYTES # BLD AUTO: 0.04 K/UL (ref 0–0.58)
IMM GRANULOCYTES NFR BLD: 1 % (ref 0–5)
KETONES UR STRIP-MCNC: NEGATIVE MG/DL
LACTATE BLDV-SCNC: 1.1 MMOL/L (ref 0.5–2.2)
LEUKOCYTE ESTERASE UR QL STRIP: NEGATIVE
LIPASE SERPL-CCNC: 29 U/L (ref 13–60)
LYMPHOCYTES NFR BLD: 2.15 K/UL (ref 1.5–4)
LYMPHOCYTES RELATIVE PERCENT: 27 % (ref 20–42)
MCH RBC QN AUTO: 31.4 PG (ref 26–35)
MCHC RBC AUTO-ENTMCNC: 31.9 G/DL (ref 32–34.5)
MCV RBC AUTO: 98.2 FL (ref 80–99.9)
MONOCYTES NFR BLD: 0.52 K/UL (ref 0.1–0.95)
MONOCYTES NFR BLD: 7 % (ref 2–12)
NEUTROPHILS NFR BLD: 63 % (ref 43–80)
NEUTS SEG NFR BLD: 5.09 K/UL (ref 1.8–7.3)
NITRITE UR QL STRIP: NEGATIVE
PH UR STRIP: 8 [PH] (ref 5–9)
PLATELET # BLD AUTO: 311 K/UL (ref 130–450)
PMV BLD AUTO: 9.3 FL (ref 7–12)
POTASSIUM SERPL-SCNC: 4.5 MMOL/L (ref 3.5–5)
PROT SERPL-MCNC: 7.3 G/DL (ref 6.4–8.3)
PROT UR STRIP-MCNC: NEGATIVE MG/DL
RBC # BLD AUTO: 4.37 M/UL (ref 3.5–5.5)
RENAL EPITHELIAL, UA: PRESENT /HPF
SODIUM SERPL-SCNC: 137 MMOL/L (ref 132–146)
SP GR UR STRIP: 1.01 (ref 1–1.03)
UROBILINOGEN UR STRIP-ACNC: 0.2 EU/DL (ref 0–1)
WBC #/AREA URNS HPF: ABNORMAL /HPF
WBC OTHER # BLD: 8.1 K/UL (ref 4.5–11.5)

## 2024-06-14 PROCEDURE — 74177 CT ABD & PELVIS W/CONTRAST: CPT

## 2024-06-14 PROCEDURE — 6360000004 HC RX CONTRAST MEDICATION: Performed by: RADIOLOGY

## 2024-06-14 PROCEDURE — 99285 EMERGENCY DEPT VISIT HI MDM: CPT

## 2024-06-14 PROCEDURE — 85025 COMPLETE CBC W/AUTO DIFF WBC: CPT

## 2024-06-14 PROCEDURE — 83690 ASSAY OF LIPASE: CPT

## 2024-06-14 PROCEDURE — 81001 URINALYSIS AUTO W/SCOPE: CPT

## 2024-06-14 PROCEDURE — 80053 COMPREHEN METABOLIC PANEL: CPT

## 2024-06-14 PROCEDURE — 83605 ASSAY OF LACTIC ACID: CPT

## 2024-06-14 RX ORDER — SODIUM CHLORIDE 0.9 % (FLUSH) 0.9 %
10 SYRINGE (ML) INJECTION
Status: DISCONTINUED | OUTPATIENT
Start: 2024-06-14 | End: 2024-06-14 | Stop reason: HOSPADM

## 2024-06-14 RX ADMIN — IOPAMIDOL 75 ML: 755 INJECTION, SOLUTION INTRAVENOUS at 14:24

## 2024-06-14 ASSESSMENT — LIFESTYLE VARIABLES: HOW OFTEN DO YOU HAVE A DRINK CONTAINING ALCOHOL: NEVER

## 2024-06-14 NOTE — ED PROVIDER NOTES
every 12 hours    ENALAPRIL (VASOTEC) 5 MG TABLET    Take 1 tablet by mouth daily    EZETIMIBE (ZETIA) 10 MG TABLET    Take 1 tablet by mouth daily    LEVOTHYROXINE (SYNTHROID) 200 MCG TABLET    Take 1.5 tablets by mouth daily    LORATADINE PO    Take 10 mg by mouth daily    METOPROLOL SUCCINATE (TOPROL XL) 25 MG EXTENDED RELEASE TABLET    Take 2 tablets by mouth daily    NITROGLYCERIN (NITROLINGUAL) 0.4 MG/SPRAY SPRAY    Place 1 spray under the tongue every 5 minutes as needed for Chest pain    OXYBUTYNIN (DITROPAN) 5 MG TABLET    Take 1 tablet by mouth nightly       ALLERGIES     Antihistamines, diphenhydramine-type; Asa [aspirin]; Bee venom; Codeine; Melatonin; Pcn [penicillins]; and Sulfa antibiotics    FAMILYHISTORY       Family History   Problem Relation Age of Onset    Diabetes Mother         SOCIAL HISTORY       Social History     Tobacco Use    Smoking status: Every Day     Current packs/day: 1.00     Types: Cigarettes    Smokeless tobacco: Never    Tobacco comments:     patient smokes 3-5 cigarettes per day   Vaping Use    Vaping Use: Former   Substance Use Topics    Alcohol use: Not Currently     Comment: drinks 8 cups of coffee daily    Drug use: No       SCREENINGS        [place] Coma Scale  Eye Opening: Spontaneous  Best Verbal Response: Oriented  Best Motor Response: Obeys commands  [place]ia Coma Scale Score: 15                CIWA Assessment  BP: (!) 168/86  Pulse: 61           PHYSICAL EXAM  1 or more Elements     ED Triage Vitals   BP Temp Temp src Pulse Respirations SpO2 Height Weight - Scale   06/14/24 1223 06/14/24 1217 -- 06/14/24 1217 06/14/24 1223 06/14/24 1217 06/14/24 1223 06/14/24 1223   (!) 168/86 97.9 °F (36.6 °C)  61 17 95 % 1.549 m (5' 1\") 74.8 kg (165 lb)         Constitutional/General: Alert and oriented x3  Head: Normocephalic and atraumatic  Eyes: PERRL, EOMI, sclera non icteric  ENT:  Oropharynx clear, handling secretions  Neck: Supple, full ROM, no stridor, no meningeal 
  Hemoglobin Quant 13.7   Hematocrit 42.9   MCV 98.2   MCH 31.4   MCHC 31.9(!)   RDW 13.2   Platelet Count 311   MPV 9.3   Neutrophils % 63   Lymphocyte % 27   Monocytes % 7   Eosinophils % 2   Basophils % 1   Immature Granulocytes % 1   Neutrophils Absolute 5.09   Lymphocytes Absolute 2.15   Monocytes Absolute 0.52   Eosinophils Absolute 0.19   Basophils Absolute 0.07   Immature Granulocytes Absolute 0.04 [CD]      ED Course User Index  [CD] Maurilio Coronado MD      She is a 69-year-old female with a history of HTN, DM, history of rheumatic fever, history of scarlet fever, Graves' disease, sleep apnea, anxiety, depression, ADHD, asthma, atrial fibrillation not on anticoagulation, HLD, GERD, IBS, diverticulosis, CAD, COPD presenting for bright red blood per rectum over the past few days that has stopped yesterday.  She also endorses left-sided abdominal pain radiating to right.  No other associated symptoms.  Differentials include but not limited to diverticulosis, diverticulitis, hemorrhoid, UTI.  At the time of my exam the patient is sitting comfortably in a chair in no acute distress.  Her vitals are unremarkable.  She is afebrile.  Exam shows heart regular rate and rhythm, lungs clear to auscultation bilaterally.  She has mild generalized abdominal pain with no rebound or guarding.  She is Hemoccult negative.  CBC and CMP are unremarkable.  Glucose is 96.  Lactic and lipase are normal.  Urinalysis unremarkable no sign of infection.  CT abdomen shows diverticulosis.  On reexamination the patient is resting comfortably in a chair in no acute distress.  I discussed lab and imaging results with the patient.  I discussed plan for discharge given unremarkable workup and cessation of rectal bleeding yesterday with follow-up with her GI doctor as well as PCP and she is agreeable.  Patient feels ready to go home.  Patient is stable for discharge at this time      CONSULTS: (Who and What was discussed)  None      I am the

## 2024-06-14 NOTE — ED NOTES
Department of Emergency Medicine  FIRST PROVIDER TRIAGE NOTE             Independent MLP           6/14/24  12:21 PM EDT    Date of Encounter: 6/14/24   MRN: [id number]      HPI: Trinity Shah is a 69 y.o. female who presents to the ED for No chief complaint on file.  Presents for complaints of of rectal bleeding and abdominal pain which she states been present for the past 5 days.  She was seen by her PCP earlier today and was sent here for evaluation of the rectal bleeding.  She states she has a known history of diverticulosis.  States he had a colonoscopy done in August 2023    ROS: Negative for cp or sob.    PE: Gen Appearance/Constitutional: alert  CV: regular rate     Initial Plan of Care: All treatment areas with department are currently occupied. Plan to order/Initiate the following while awaiting opening in ED: labs and imaging studies.  Initiate Treatment-Testing, Proceed toTreatment Area When Bed Available for ED Attending/MLP to Continue Care    Electronically signed by SOCORRO Haider CNP   DD: 6/14/24       Meera Parker APRN - CNP  06/14/24 1222

## 2024-06-18 ENCOUNTER — TELEPHONE (OUTPATIENT)
Dept: ADMINISTRATIVE | Age: 69
End: 2024-06-18

## 2024-06-18 NOTE — TELEPHONE ENCOUNTER
Pt having pressure when walking. NS and canceled last couple of New pt appts. When can schedule?  [phone]

## 2024-06-25 PROBLEM — E66.9 MODERATE OBESITY: Status: ACTIVE | Noted: 2024-06-25

## 2024-06-25 PROBLEM — F31.9 CHRONIC BIPOLAR DISORDER (HCC): Status: ACTIVE | Noted: 2024-06-25

## 2024-06-25 PROBLEM — I48.3 TYPICAL ATRIAL FLUTTER (HCC): Status: ACTIVE | Noted: 2024-06-25

## 2024-06-25 PROBLEM — E66.8 MODERATE OBESITY: Status: ACTIVE | Noted: 2024-06-25

## 2024-06-25 PROBLEM — E78.00 PURE HYPERCHOLESTEROLEMIA: Status: ACTIVE | Noted: 2024-06-25

## 2024-06-27 ENCOUNTER — OFFICE VISIT (OUTPATIENT)
Dept: CARDIOLOGY CLINIC | Age: 69
End: 2024-06-27
Payer: MEDICAID

## 2024-06-27 VITALS
WEIGHT: 162 LBS | RESPIRATION RATE: 20 BRPM | HEIGHT: 61 IN | BODY MASS INDEX: 30.58 KG/M2 | DIASTOLIC BLOOD PRESSURE: 84 MMHG | HEART RATE: 71 BPM | SYSTOLIC BLOOD PRESSURE: 124 MMHG

## 2024-06-27 DIAGNOSIS — J44.9 CHRONIC OBSTRUCTIVE PULMONARY DISEASE, UNSPECIFIED COPD TYPE (HCC): ICD-10-CM

## 2024-06-27 DIAGNOSIS — F09 COGNITIVE DYSFUNCTION: ICD-10-CM

## 2024-06-27 DIAGNOSIS — R07.89 ATYPICAL CHEST PAIN: ICD-10-CM

## 2024-06-27 DIAGNOSIS — I25.10 CORONARY ARTERY DISEASE INVOLVING NATIVE CORONARY ARTERY OF NATIVE HEART WITHOUT ANGINA PECTORIS: Primary | ICD-10-CM

## 2024-06-27 DIAGNOSIS — E78.00 PURE HYPERCHOLESTEROLEMIA: ICD-10-CM

## 2024-06-27 DIAGNOSIS — F31.9 CHRONIC BIPOLAR DISORDER (HCC): ICD-10-CM

## 2024-06-27 DIAGNOSIS — R00.2 PALPITATIONS: ICD-10-CM

## 2024-06-27 DIAGNOSIS — E66.8 MODERATE OBESITY: ICD-10-CM

## 2024-06-27 DIAGNOSIS — I48.3 TYPICAL ATRIAL FLUTTER (HCC): ICD-10-CM

## 2024-06-27 DIAGNOSIS — I10 ESSENTIAL HYPERTENSION: ICD-10-CM

## 2024-06-27 PROCEDURE — 4004F PT TOBACCO SCREEN RCVD TLK: CPT | Performed by: INTERNAL MEDICINE

## 2024-06-27 PROCEDURE — 1090F PRES/ABSN URINE INCON ASSESS: CPT | Performed by: INTERNAL MEDICINE

## 2024-06-27 PROCEDURE — G8427 DOCREV CUR MEDS BY ELIG CLIN: HCPCS | Performed by: INTERNAL MEDICINE

## 2024-06-27 PROCEDURE — G8399 PT W/DXA RESULTS DOCUMENT: HCPCS | Performed by: INTERNAL MEDICINE

## 2024-06-27 PROCEDURE — 3079F DIAST BP 80-89 MM HG: CPT | Performed by: INTERNAL MEDICINE

## 2024-06-27 PROCEDURE — 93000 ELECTROCARDIOGRAM COMPLETE: CPT | Performed by: INTERNAL MEDICINE

## 2024-06-27 PROCEDURE — 1123F ACP DISCUSS/DSCN MKR DOCD: CPT | Performed by: INTERNAL MEDICINE

## 2024-06-27 PROCEDURE — 3023F SPIROM DOC REV: CPT | Performed by: INTERNAL MEDICINE

## 2024-06-27 PROCEDURE — G8417 CALC BMI ABV UP PARAM F/U: HCPCS | Performed by: INTERNAL MEDICINE

## 2024-06-27 PROCEDURE — 3074F SYST BP LT 130 MM HG: CPT | Performed by: INTERNAL MEDICINE

## 2024-06-27 PROCEDURE — 3017F COLORECTAL CA SCREEN DOC REV: CPT | Performed by: INTERNAL MEDICINE

## 2024-06-27 PROCEDURE — 99205 OFFICE O/P NEW HI 60 MIN: CPT | Performed by: INTERNAL MEDICINE

## 2024-06-27 RX ORDER — FLUTICASONE PROPIONATE 50 MCG
1 SPRAY, SUSPENSION (ML) NASAL DAILY
COMMUNITY

## 2024-06-27 RX ORDER — NYSTATIN 100000 U/G
CREAM TOPICAL 2 TIMES DAILY
COMMUNITY

## 2024-06-27 RX ORDER — OLOPATADINE HYDROCHLORIDE 2 MG/ML
1 SOLUTION/ DROPS OPHTHALMIC DAILY
COMMUNITY

## 2024-06-27 RX ORDER — GABAPENTIN 300 MG/1
300 CAPSULE ORAL 3 TIMES DAILY
COMMUNITY

## 2024-06-27 RX ORDER — OMEGA-3/DHA/EPA/FISH OIL 60 MG-90MG
100 CAPSULE ORAL DAILY
COMMUNITY

## 2024-06-27 RX ORDER — TIOTROPIUM BROMIDE 18 UG/1
18 CAPSULE ORAL; RESPIRATORY (INHALATION) DAILY
COMMUNITY

## 2024-06-27 RX ORDER — CALCIUM CARBONATE 500(1250)
500 TABLET ORAL DAILY
COMMUNITY

## 2024-06-27 RX ORDER — RALOXIFENE HYDROCHLORIDE 60 MG/1
60 TABLET, FILM COATED ORAL DAILY
COMMUNITY

## 2024-06-27 NOTE — PROGRESS NOTES
OUTPATIENT CARDIOLOGY CONSULT    Name: Trinity Shah    Age: 69 y.o.    Date of Service: 6/27/2024    Reason for Consultation: Coronary atherosclerosis, atypical chest pain, palpitations, paroxysmal atrial flutter, chronic obstructive lung disease, hypertension, bipolar disorder, moderate obesity, cognitive dysfunction    Referring Physician: Bull Bates NP    History of Present Illness: The patient is a 69-year-old white female with an apparent history of coronary atherosclerosis with coronary angiography in 2003 reportedly demonstrating an 80% stenosis of a first diagonal branch of the left anterior descending and a 50% stenosis of the second diagonal branch with occlusion of a circumflex marginal branch and a 50% stenosis of the mid right coronary artery with normal left ventricular systolic function.  She was most recently evaluated by Chuck Hanks approximately 5 years earlier with reported additional evidence of reported paroxysmal atrial flutter and in conjunction with that assessment, an echocardiogram of June, 2019 demonstrating evidence of a normal size left ventricular chamber with mild hypokinesis of the inferobasal segment and normal left ventricular systolic function with no evidence of valvular abnormalities and a gated vasodilator myocardial perfusion imaging study demonstrating no evidence of perfusion at base with normal left ventricular systolic function in addition to that of arrhythmia monitoring demonstrating evidence of sinus rhythm with appropriate heart rate variability and no significant ectopy nor arrhythmias.  She additionally has a history of hypertension, hyperlipidemia and chronic obstructive lung disease in the face of ongoing tobacco consumption in addition to that of a chronic bipolar disorder and cognitive dysfunction.  At the time of her present assessment, she presents with symptoms of chest discomfort of a random nature and described as the sensation of a

## 2024-07-12 ENCOUNTER — TELEPHONE (OUTPATIENT)
Dept: CARDIOLOGY | Age: 69
End: 2024-07-12

## 2024-07-12 NOTE — TELEPHONE ENCOUNTER
Left message on voice mail to remind patient of pharmacological  stress test appointment on 7/15/2024 at 0945. Instructions for test,left on voicemail including NPO after MN except for morning medications with sips of water, no caffeine products 12 hours prior to the test , and preprocedure information left on voice mail. Asked patient to call with any questions or if unable to keep appointment.

## 2024-07-15 ENCOUNTER — HOSPITAL ENCOUNTER (OUTPATIENT)
Dept: CARDIOLOGY | Age: 69
Discharge: HOME | End: 2024-07-17
Attending: INTERNAL MEDICINE
Payer: MEDICAID

## 2024-07-15 VITALS
DIASTOLIC BLOOD PRESSURE: 88 MMHG | HEART RATE: 64 BPM | WEIGHT: 162 LBS | RESPIRATION RATE: 16 BRPM | BODY MASS INDEX: 30.58 KG/M2 | HEIGHT: 61 IN | SYSTOLIC BLOOD PRESSURE: 134 MMHG

## 2024-07-15 DIAGNOSIS — I25.10 CORONARY ARTERY DISEASE INVOLVING NATIVE CORONARY ARTERY OF NATIVE HEART WITHOUT ANGINA PECTORIS: ICD-10-CM

## 2024-07-15 PROCEDURE — 6360000002 HC RX W HCPCS: Performed by: INTERNAL MEDICINE

## 2024-07-15 PROCEDURE — 93017 CV STRESS TEST TRACING ONLY: CPT

## 2024-07-15 PROCEDURE — 3430000000 HC RX DIAGNOSTIC RADIOPHARMACEUTICAL: Performed by: INTERNAL MEDICINE

## 2024-07-15 PROCEDURE — A9502 TC99M TETROFOSMIN: HCPCS | Performed by: INTERNAL MEDICINE

## 2024-07-15 PROCEDURE — 2580000003 HC RX 258: Performed by: INTERNAL MEDICINE

## 2024-07-15 RX ORDER — REGADENOSON 0.08 MG/ML
0.4 INJECTION, SOLUTION INTRAVENOUS
Status: COMPLETED | OUTPATIENT
Start: 2024-07-15 | End: 2024-07-15

## 2024-07-15 RX ORDER — SODIUM CHLORIDE 0.9 % (FLUSH) 0.9 %
10 SYRINGE (ML) INJECTION PRN
Status: DISCONTINUED | OUTPATIENT
Start: 2024-07-15 | End: 2024-07-18 | Stop reason: HOSPADM

## 2024-07-15 RX ORDER — OMEPRAZOLE 40 MG/1
40 CAPSULE, DELAYED RELEASE ORAL DAILY
COMMUNITY
Start: 2024-06-10

## 2024-07-15 RX ORDER — LEVOTHYROXINE SODIUM 300 UG/1
300 TABLET ORAL DAILY
COMMUNITY
Start: 2024-06-10

## 2024-07-15 RX ORDER — EPINEPHRINE 0.3 MG/.3ML
INJECTION SUBCUTANEOUS
COMMUNITY
Start: 2024-06-10

## 2024-07-15 RX ORDER — ACETAMINOPHEN 500 MG
1000 TABLET ORAL PRN
COMMUNITY
Start: 2024-06-10

## 2024-07-15 RX ORDER — ASPIRIN 81 MG/1
81 TABLET, COATED ORAL DAILY
COMMUNITY
Start: 2024-06-10

## 2024-07-15 RX ADMIN — SODIUM CHLORIDE, PRESERVATIVE FREE 10 ML: 5 INJECTION INTRAVENOUS at 09:04

## 2024-07-15 RX ADMIN — SODIUM CHLORIDE, PRESERVATIVE FREE 10 ML: 5 INJECTION INTRAVENOUS at 10:35

## 2024-07-15 RX ADMIN — TETROFOSMIN 27.5 MILLICURIE: 0.23 INJECTION, POWDER, LYOPHILIZED, FOR SOLUTION INTRAVENOUS at 10:35

## 2024-07-15 RX ADMIN — REGADENOSON 0.4 MG: 0.08 INJECTION, SOLUTION INTRAVENOUS at 10:35

## 2024-07-15 RX ADMIN — TETROFOSMIN 9 MILLICURIE: 0.23 INJECTION, POWDER, LYOPHILIZED, FOR SOLUTION INTRAVENOUS at 09:04

## 2024-07-16 ENCOUNTER — TELEPHONE (OUTPATIENT)
Dept: CARDIOLOGY CLINIC | Age: 69
End: 2024-07-16

## 2024-07-16 LAB
ECHO BSA: 1.78 M2
NUC STRESS EJECTION FRACTION: 60 %
STRESS BASELINE DIAS BP: 88 MMHG
STRESS BASELINE HR: 63 BPM
STRESS BASELINE SYS BP: 134 MMHG
STRESS ESTIMATED WORKLOAD: 1 METS
STRESS O2 SAT REST: 99 %
STRESS PEAK DIAS BP: 88 MMHG
STRESS PEAK SYS BP: 134 MMHG
STRESS PERCENT HR ACHIEVED: 60 %
STRESS POST PEAK HR: 92 BPM
STRESS RATE PRESSURE PRODUCT: NORMAL BPM*MMHG
STRESS TARGET HR: 153 BPM

## 2024-07-16 PROCEDURE — 93016 CV STRESS TEST SUPVJ ONLY: CPT | Performed by: INTERNAL MEDICINE

## 2024-07-16 PROCEDURE — 78452 HT MUSCLE IMAGE SPECT MULT: CPT | Performed by: INTERNAL MEDICINE

## 2024-07-16 PROCEDURE — 93018 CV STRESS TEST I&R ONLY: CPT | Performed by: INTERNAL MEDICINE

## 2024-07-16 NOTE — TELEPHONE ENCOUNTER
----- Message from Alexis Clark MD sent at 7/16/2024  2:17 PM EDT -----  Please notify patient that perfusion study suggests no evidence of progression of prior noted coronary atherosclerosis.  Continue as directed and recommend follow-up in 1 year

## 2024-12-13 ENCOUNTER — HOSPITAL ENCOUNTER (OUTPATIENT)
Dept: CT IMAGING | Age: 69
Discharge: HOME | End: 2024-12-15
Payer: MEDICAID

## 2024-12-13 ENCOUNTER — HOSPITAL ENCOUNTER (EMERGENCY)
Age: 69
Discharge: LEFT BEFORE DISCHARGE | End: 2024-12-14
Payer: MEDICAID

## 2024-12-13 VITALS
RESPIRATION RATE: 18 BRPM | BODY MASS INDEX: 26.81 KG/M2 | DIASTOLIC BLOOD PRESSURE: 82 MMHG | WEIGHT: 142 LBS | SYSTOLIC BLOOD PRESSURE: 137 MMHG | HEIGHT: 61 IN | OXYGEN SATURATION: 98 % | TEMPERATURE: 97.9 F | HEART RATE: 70 BPM

## 2024-12-13 DIAGNOSIS — R63.4 WEIGHT LOSS: ICD-10-CM

## 2024-12-13 DIAGNOSIS — Z87.891 PERSONAL HISTORY OF TOBACCO USE, PRESENTING HAZARDS TO HEALTH: ICD-10-CM

## 2024-12-13 DIAGNOSIS — F17.200 TOBACCO USE DISORDER: ICD-10-CM

## 2024-12-13 DIAGNOSIS — F17.210 CIGARETTE SMOKER: ICD-10-CM

## 2024-12-13 DIAGNOSIS — S86.911A KNEE STRAIN, RIGHT, INITIAL ENCOUNTER: Primary | ICD-10-CM

## 2024-12-13 PROCEDURE — 74176 CT ABD & PELVIS W/O CONTRAST: CPT

## 2024-12-13 PROCEDURE — 99281 EMR DPT VST MAYX REQ PHY/QHP: CPT

## 2024-12-13 PROCEDURE — 71271 CT THORAX LUNG CANCER SCR C-: CPT

## 2024-12-13 ASSESSMENT — PAIN DESCRIPTION - LOCATION: LOCATION: KNEE

## 2024-12-13 ASSESSMENT — PAIN DESCRIPTION - PAIN TYPE: TYPE: ACUTE PAIN

## 2024-12-13 ASSESSMENT — PAIN SCALES - GENERAL: PAINLEVEL_OUTOF10: 8

## 2024-12-13 ASSESSMENT — PAIN DESCRIPTION - ORIENTATION: ORIENTATION: RIGHT

## 2024-12-13 ASSESSMENT — LIFESTYLE VARIABLES
HOW MANY STANDARD DRINKS CONTAINING ALCOHOL DO YOU HAVE ON A TYPICAL DAY: PATIENT DOES NOT DRINK
HOW OFTEN DO YOU HAVE A DRINK CONTAINING ALCOHOL: NEVER

## 2024-12-13 ASSESSMENT — PAIN - FUNCTIONAL ASSESSMENT: PAIN_FUNCTIONAL_ASSESSMENT: 0-10

## 2024-12-13 NOTE — ED PROVIDER NOTES
Urinary incontinence.  Surgical History:  has a past surgical history that includes Atrial ablation surgery (1/12/00); Diagnostic Cardiac Cath Lab Procedure (9/28/95); Diagnostic Cardiac Cath Lab Procedure (9/16/99); Diagnostic Cardiac Cath Lab Procedure (9/23/03); Cholecystectomy; Hysterectomy; Wrist surgery (6/95); Appendectomy; and Colonoscopy.  Social History:  reports that she has been smoking cigarettes. She started smoking about 40 years ago. She has a 80.1 pack-year smoking history. She has never used smokeless tobacco. She reports that she does not currently use alcohol. She reports that she does not use drugs.  Family History: family history includes Diabetes in her mother.   Allergies: Antihistamines, diphenhydramine-type; Asa [aspirin]; Bee venom; Codeine; Melatonin; Pcn [penicillins]; and Sulfa antibiotics  CURRENT MEDICATIONS       Previous Medications    ACETAMINOPHEN (TYLENOL) 500 MG TABLET    2 tablets as needed    ASPIRIN LOW DOSE 81 MG EC TABLET    1 tablet daily    ATORVASTATIN (LIPITOR) 80 MG TABLET    Take 1 tablet by mouth daily    CALCIUM CARBONATE (OSCAL) 500 MG TABS TABLET    Take 1 tablet by mouth daily    CALCIUM CARBONATE-VITAMIN D (OYSTER CALCIUM + D PO)    Take 500 mg by mouth 2 times daily.    CLOPIDOGREL (PLAVIX) 75 MG TABLET    Take 1 tablet by mouth daily    ENALAPRIL (VASOTEC) 5 MG TABLET    Take 1 tablet by mouth daily    EPINEPHRINE (EPIPEN) 0.3 MG/0.3ML SOAJ INJECTION        EZETIMIBE (ZETIA) 10 MG TABLET    Take 1 tablet by mouth daily    FLUTICASONE (FLONASE) 50 MCG/ACT NASAL SPRAY    1 spray by Each Nostril route daily    FLUTICASONE FUROATE-VILANTEROL (BREO ELLIPTA IN)    Inhale into the lungs    GABAPENTIN (NEURONTIN) 300 MG CAPSULE    Take 1 capsule by mouth 3 times daily.    LEVOTHYROXINE (SYNTHROID) 300 MCG TABLET    Take 1 tablet by mouth Daily    LORATADINE PO    Take 10 mg by mouth daily    METOPROLOL SUCCINATE (TOPROL XL) 25 MG EXTENDED RELEASE TABLET    Take 2  tablets by mouth daily    NITROGLYCERIN (NITROLINGUAL) 0.4 MG/SPRAY SPRAY    Place 1 spray under the tongue every 5 minutes as needed for Chest pain    NYSTATIN (MYCOSTATIN) 516638 UNIT/GM CREAM    Apply topically 2 times daily Apply topically 2 times daily.    OLOPATADINE (PATADAY) 0.2 % SOLN OPHTHALMIC SOLUTION    1 drop daily as needed    OMEGA-3 FATTY ACIDS (FISH OIL) 500 MG CAPS    Take 100 mg by mouth daily    OMEPRAZOLE (PRILOSEC) 40 MG DELAYED RELEASE CAPSULE    Take 1 capsule by mouth daily    OXYBUTYNIN (DITROPAN) 5 MG TABLET    Take 1 tablet by mouth nightly    RALOXIFENE (EVISTA) 60 MG TABLET    Take 1 tablet by mouth daily    TIOTROPIUM (SPIRIVA HANDIHALER) 18 MCG INHALATION CAPSULE    Inhale 1 capsule into the lungs daily    VITAMIN D (CHOLECALCIFEROL) 25 MCG (1000 UT) TABS TABLET    Take 1 tablet by mouth daily       SCREENINGS     [place] Coma Scale  Eye Opening: Spontaneous  Best Verbal Response: Oriented  Best Motor Response: Obeys commands  Iglesia Coma Scale Score: 15         CIWA Assessment  BP: 137/82  Pulse: 70       PHYSICAL EXAM   Oxygen Saturation Interpretation: Normal on room air analysis.        ED Triage Vitals   BP Systolic BP Percentile Diastolic BP Percentile Temp Temp src Pulse Respirations SpO2   12/13/24 1443 -- -- 12/13/24 1422 -- 12/13/24 1422 12/13/24 1443 12/13/24 1422   137/82   97.9 °F (36.6 °C)  88 18 98 %      Height Weight - Scale         12/13/24 1443 12/13/24 1443         1.549 m (5' 1\") 64.4 kg (142 lb)             Constitutional/General: Alert and oriented x3, well appearing, non toxic.  HEENT:  Airway patent.  Neck: Supple, full ROM, no meningeal signs.  Respiratory: Not in respiratory distress.  CV:  Regular rate. Regular rhythm.   Chest: No chest wall tenderness.  GI:  Abdomen Soft, nontender, Non distended.  No rebound, guarding, or rigidity.  Musculoskeletal: Moves all extremities x 4. Warm and well perfused, no clubbing, cyanosis, or edema.  Tenderness upon

## 2025-01-14 ENCOUNTER — TELEPHONE (OUTPATIENT)
Dept: CARDIOLOGY CLINIC | Age: 70
End: 2025-01-14

## 2025-01-14 NOTE — TELEPHONE ENCOUNTER
Pt needs cardiac clearance for upper endoscopy and colonoscopy with anesthesia.   Can pt hold Plavix   Last seen  6/27/24    Fax [phone]

## 2025-03-19 ENCOUNTER — TRANSCRIBE ORDERS (OUTPATIENT)
Dept: ADMINISTRATIVE | Age: 70
End: 2025-03-19

## 2025-03-19 DIAGNOSIS — R91.8 LUNG MASS: Primary | ICD-10-CM

## 2025-05-06 ENCOUNTER — TRANSCRIBE ORDERS (OUTPATIENT)
Dept: ADMINISTRATIVE | Age: 70
End: 2025-05-06

## 2025-05-06 DIAGNOSIS — R51.9 FACIAL PAIN: Primary | ICD-10-CM
